# Patient Record
Sex: FEMALE | Race: WHITE | Employment: OTHER | URBAN - METROPOLITAN AREA
[De-identification: names, ages, dates, MRNs, and addresses within clinical notes are randomized per-mention and may not be internally consistent; named-entity substitution may affect disease eponyms.]

---

## 2018-02-02 ENCOUNTER — OFFICE VISIT (OUTPATIENT)
Dept: FAMILY MEDICINE CLINIC | Age: 63
End: 2018-02-02

## 2018-02-02 ENCOUNTER — TELEPHONE (OUTPATIENT)
Dept: FAMILY MEDICINE CLINIC | Age: 63
End: 2018-02-02

## 2018-02-02 VITALS
TEMPERATURE: 97.8 F | WEIGHT: 150 LBS | SYSTOLIC BLOOD PRESSURE: 130 MMHG | HEIGHT: 64 IN | OXYGEN SATURATION: 97 % | BODY MASS INDEX: 25.61 KG/M2 | DIASTOLIC BLOOD PRESSURE: 76 MMHG | RESPIRATION RATE: 14 BRPM | HEART RATE: 72 BPM

## 2018-02-02 DIAGNOSIS — B96.89 BACTERIAL URI: ICD-10-CM

## 2018-02-02 DIAGNOSIS — R68.89 FLU-LIKE SYMPTOMS: Primary | ICD-10-CM

## 2018-02-02 DIAGNOSIS — J06.9 BACTERIAL URI: ICD-10-CM

## 2018-02-02 LAB
INFLUENZA A ANTIBODY: NORMAL
INFLUENZA B ANTIBODY: NORMAL

## 2018-02-02 PROCEDURE — G8419 CALC BMI OUT NRM PARAM NOF/U: HCPCS | Performed by: FAMILY MEDICINE

## 2018-02-02 PROCEDURE — 99203 OFFICE O/P NEW LOW 30 MIN: CPT | Performed by: FAMILY MEDICINE

## 2018-02-02 PROCEDURE — G8427 DOCREV CUR MEDS BY ELIG CLIN: HCPCS | Performed by: FAMILY MEDICINE

## 2018-02-02 PROCEDURE — 3014F SCREEN MAMMO DOC REV: CPT | Performed by: FAMILY MEDICINE

## 2018-02-02 PROCEDURE — 3017F COLORECTAL CA SCREEN DOC REV: CPT | Performed by: FAMILY MEDICINE

## 2018-02-02 PROCEDURE — 87804 INFLUENZA ASSAY W/OPTIC: CPT | Performed by: FAMILY MEDICINE

## 2018-02-02 PROCEDURE — G8484 FLU IMMUNIZE NO ADMIN: HCPCS | Performed by: FAMILY MEDICINE

## 2018-02-02 PROCEDURE — 1036F TOBACCO NON-USER: CPT | Performed by: FAMILY MEDICINE

## 2018-02-02 RX ORDER — AZITHROMYCIN 250 MG/1
250 TABLET, FILM COATED ORAL DAILY
Qty: 1 PACKET | Refills: 0 | Status: SHIPPED | OUTPATIENT
Start: 2018-02-02 | End: 2018-02-13 | Stop reason: ALTCHOICE

## 2018-02-02 RX ORDER — BENZONATATE 100 MG/1
100 CAPSULE ORAL 3 TIMES DAILY PRN
Qty: 30 CAPSULE | Refills: 1 | Status: SHIPPED | OUTPATIENT
Start: 2018-02-02 | End: 2018-02-12

## 2018-02-02 RX ORDER — FLUTICASONE PROPIONATE 50 MCG
1 SPRAY, SUSPENSION (ML) NASAL DAILY
Qty: 1 BOTTLE | Refills: 0 | Status: SHIPPED | OUTPATIENT
Start: 2018-02-02 | End: 2022-02-24

## 2018-02-02 ASSESSMENT — PATIENT HEALTH QUESTIONNAIRE - PHQ9
2. FEELING DOWN, DEPRESSED OR HOPELESS: 0
SUM OF ALL RESPONSES TO PHQ9 QUESTIONS 1 & 2: 0
SUM OF ALL RESPONSES TO PHQ QUESTIONS 1-9: 0
1. LITTLE INTEREST OR PLEASURE IN DOING THINGS: 0

## 2018-02-02 ASSESSMENT — ENCOUNTER SYMPTOMS
ABDOMINAL PAIN: 0
NAUSEA: 0
SORE THROAT: 1
COUGH: 1
RHINORRHEA: 1
VOMITING: 0

## 2018-02-02 NOTE — PROGRESS NOTES
previous visit. Review of Systems   Constitutional: Negative for chills and fever. HENT: Positive for congestion, rhinorrhea, sneezing and sore throat (not today). Respiratory: Positive for cough. Gastrointestinal: Negative for abdominal pain, nausea and vomiting. /76 (Site: Left Arm, Position: Sitting, Cuff Size: Medium Adult)   Pulse 72   Temp 97.8 °F (36.6 °C) (Oral)   Resp 14   Ht 5' 4\" (1.626 m)   Wt 150 lb (68 kg)   SpO2 97%   Breastfeeding? No   BMI 25.75 kg/m²     Physical Exam   Constitutional: She is oriented to person, place, and time. She appears well-developed and well-nourished. HENT:   Head: Normocephalic and atraumatic. Eyes: EOM are normal. Pupils are equal, round, and reactive to light. Neck: Normal range of motion. Cardiovascular: Normal rate, regular rhythm and normal heart sounds. Pulmonary/Chest: Effort normal and breath sounds normal.   Abdominal: Bowel sounds are normal.   Neurological: She is alert and oriented to person, place, and time. Psychiatric: She has a normal mood and affect. Her behavior is normal. Judgment and thought content normal.       Plan  1. Flu-like symptoms  POCT Influenza A/B, NEGATIVE   2. Bacterial URI  TSH without Reflex    T4, Free    T3, Free    Lipid Panel    CBC Auto Differential    Comprehensive Metabolic Panel    Magnesium    azithromycin (ZITHROMAX Z-RUTH ANN) 250 MG tablet    fluticasone (FLONASE) 50 MCG/ACT nasal spray    benzonatate (TESSALON PERLES) 100 MG capsule       Return in about 1 month (around 3/2/2018) for Physical Exam.    Prior to Visit Medications    Medication Sig Taking?  Authorizing Provider   azithromycin (ZITHROMAX Z-RUTH ANN) 250 MG tablet Take 1 tablet by mouth daily As directed on pack Yes Dustin Ing, DO   fluticasone (FLONASE) 50 MCG/ACT nasal spray 1 spray by Nasal route daily for 7 days Yes Dustin Ing, DO   benzonatate (TESSALON PERLES) 100 MG capsule Take 1 capsule by mouth 3 times

## 2018-02-05 ENCOUNTER — TELEPHONE (OUTPATIENT)
Dept: FAMILY MEDICINE CLINIC | Age: 63
End: 2018-02-05

## 2018-02-12 ENCOUNTER — TELEPHONE (OUTPATIENT)
Dept: FAMILY MEDICINE CLINIC | Age: 63
End: 2018-02-12

## 2018-02-12 NOTE — TELEPHONE ENCOUNTER
Symptoms:cough-productive-clear mucus,headache,feels bad    How long have you had the symptoms: since before appt on 2/2/18    What medications have you tried:zpak    Pharmacy:[facility] on file    Appointment offered:yes,  pt wants to know what to do. Pt would like for one of the other providers to take a look at her continuing symptoms and advise since Dr Olga Olguin is out today.

## 2018-02-13 ENCOUNTER — OFFICE VISIT (OUTPATIENT)
Dept: FAMILY MEDICINE CLINIC | Age: 63
End: 2018-02-13

## 2018-02-13 ENCOUNTER — TELEPHONE (OUTPATIENT)
Dept: FAMILY MEDICINE CLINIC | Age: 63
End: 2018-02-13

## 2018-02-13 VITALS
HEART RATE: 77 BPM | BODY MASS INDEX: 26.26 KG/M2 | SYSTOLIC BLOOD PRESSURE: 154 MMHG | TEMPERATURE: 97.7 F | WEIGHT: 153 LBS | DIASTOLIC BLOOD PRESSURE: 91 MMHG | RESPIRATION RATE: 16 BRPM

## 2018-02-13 DIAGNOSIS — R05.9 COUGH: ICD-10-CM

## 2018-02-13 DIAGNOSIS — J30.2 CHRONIC SEASONAL ALLERGIC RHINITIS DUE TO OTHER ALLERGEN: ICD-10-CM

## 2018-02-13 DIAGNOSIS — J34.89 SINUS PRESSURE: Primary | ICD-10-CM

## 2018-02-13 PROCEDURE — 1036F TOBACCO NON-USER: CPT | Performed by: NURSE PRACTITIONER

## 2018-02-13 PROCEDURE — G8419 CALC BMI OUT NRM PARAM NOF/U: HCPCS | Performed by: NURSE PRACTITIONER

## 2018-02-13 PROCEDURE — G8427 DOCREV CUR MEDS BY ELIG CLIN: HCPCS | Performed by: NURSE PRACTITIONER

## 2018-02-13 PROCEDURE — G8484 FLU IMMUNIZE NO ADMIN: HCPCS | Performed by: NURSE PRACTITIONER

## 2018-02-13 PROCEDURE — 3014F SCREEN MAMMO DOC REV: CPT | Performed by: NURSE PRACTITIONER

## 2018-02-13 PROCEDURE — 3017F COLORECTAL CA SCREEN DOC REV: CPT | Performed by: NURSE PRACTITIONER

## 2018-02-13 PROCEDURE — 99214 OFFICE O/P EST MOD 30 MIN: CPT | Performed by: NURSE PRACTITIONER

## 2018-02-13 RX ORDER — BENZONATATE 100 MG/1
100 CAPSULE ORAL PRN
COMMUNITY
End: 2022-02-24 | Stop reason: ALTCHOICE

## 2018-02-13 ASSESSMENT — ENCOUNTER SYMPTOMS
WHEEZING: 0
SINUS PRESSURE: 1
SHORTNESS OF BREATH: 0
COUGH: 1
SORE THROAT: 0
HOARSE VOICE: 0
STRIDOR: 0
SINUS COMPLAINT: 1
HEMOPTYSIS: 0
RHINORRHEA: 1
SWOLLEN GLANDS: 0

## 2018-02-13 NOTE — PATIENT INSTRUCTIONS
strong odors. When should you call for help? Give an epinephrine shot if:  ? · You think you are having a severe allergic reaction. ? After giving an epinephrine shot call 911, even if you feel better. ?Call 911 if:  ? · You have symptoms of a severe allergic reaction. These may include:  ¨ Sudden raised, red areas (hives) all over your body. ¨ Swelling of the throat, mouth, lips, or tongue. ¨ Trouble breathing. ¨ Passing out (losing consciousness). Or you may feel very lightheaded or suddenly feel weak, confused, or restless. ? · You have been given an epinephrine shot, even if you feel better. ?Call your doctor now or seek immediate medical care if:  ? · You have symptoms of an allergic reaction, such as:  ¨ A rash or hives (raised, red areas on the skin). ¨ Itching. ¨ Swelling. ¨ Belly pain, nausea, or vomiting. ? Watch closely for changes in your health, and be sure to contact your doctor if:  ? · Your allergies get worse. ? · You need help controlling your allergies. ? · You have questions about allergy testing. ? · You do not get better as expected. Where can you learn more? Go to [email or web address] and sign in to your [employer] account. Enter L249 in the RoomiePics box to learn more about \"Managing Your Allergies: Care Instructions. \"     If you do not have an account, please click on the \"Sign Up Now\" link. Current as of: September 29, 2016  Content Version: 11.5  © 9286-7652 Healthwise, Incorporated. Care instructions adapted under license by [facility] ([facility]). If you have questions about a medical condition or this instruction, always ask your healthcare professional. [place] [address] any warranty or liability for your use of this information.

## 2022-02-24 ENCOUNTER — OFFICE VISIT (OUTPATIENT)
Dept: FAMILY MEDICINE CLINIC | Age: 67
End: 2022-02-24
Payer: COMMERCIAL

## 2022-02-24 VITALS
BODY MASS INDEX: 24.75 KG/M2 | DIASTOLIC BLOOD PRESSURE: 70 MMHG | SYSTOLIC BLOOD PRESSURE: 128 MMHG | HEART RATE: 77 BPM | TEMPERATURE: 96.4 F | HEIGHT: 64 IN | RESPIRATION RATE: 16 BRPM | OXYGEN SATURATION: 98 % | WEIGHT: 145 LBS

## 2022-02-24 DIAGNOSIS — H61.23 BILATERAL IMPACTED CERUMEN: ICD-10-CM

## 2022-02-24 DIAGNOSIS — M54.2 NECK PAIN: Primary | ICD-10-CM

## 2022-02-24 DIAGNOSIS — E78.00 ELEVATED LDL CHOLESTEROL LEVEL: ICD-10-CM

## 2022-02-24 PROCEDURE — 69209 REMOVE IMPACTED EAR WAX UNI: CPT | Performed by: FAMILY MEDICINE

## 2022-02-24 PROCEDURE — 99204 OFFICE O/P NEW MOD 45 MIN: CPT | Performed by: FAMILY MEDICINE

## 2022-02-24 RX ORDER — MELOXICAM 15 MG/1
15 TABLET ORAL DAILY
Qty: 30 TABLET | Refills: 0 | Status: SHIPPED | OUTPATIENT
Start: 2022-02-24 | End: 2022-04-26 | Stop reason: ALTCHOICE

## 2022-02-24 RX ORDER — TIZANIDINE 4 MG/1
4 TABLET ORAL EVERY 8 HOURS PRN
Qty: 21 TABLET | Refills: 0 | Status: SHIPPED | OUTPATIENT
Start: 2022-02-24 | End: 2022-03-03

## 2022-02-24 RX ORDER — FLUOCINOLONE ACETONIDE 0.11 MG/ML
2 OIL AURICULAR (OTIC) 2 TIMES DAILY PRN
Qty: 1 EACH | Refills: 1 | Status: SHIPPED | OUTPATIENT
Start: 2022-02-24

## 2022-02-24 RX ORDER — ATORVASTATIN CALCIUM 40 MG/1
40 TABLET, FILM COATED ORAL DAILY
COMMUNITY

## 2022-02-24 RX ORDER — FLUOCINOLONE ACETONIDE 0.11 MG/ML
OIL AURICULAR (OTIC) 2 TIMES DAILY
COMMUNITY
End: 2022-02-24 | Stop reason: SDUPTHER

## 2022-02-24 RX ORDER — AMLODIPINE BESYLATE 2.5 MG/1
2.5 TABLET ORAL DAILY
COMMUNITY

## 2022-02-24 SDOH — ECONOMIC STABILITY: TRANSPORTATION INSECURITY
IN THE PAST 12 MONTHS, HAS LACK OF TRANSPORTATION KEPT YOU FROM MEETINGS, WORK, OR FROM GETTING THINGS NEEDED FOR DAILY LIVING?: NO

## 2022-02-24 SDOH — ECONOMIC STABILITY: FOOD INSECURITY: WITHIN THE PAST 12 MONTHS, THE FOOD YOU BOUGHT JUST DIDN'T LAST AND YOU DIDN'T HAVE MONEY TO GET MORE.: NEVER TRUE

## 2022-02-24 SDOH — ECONOMIC STABILITY: TRANSPORTATION INSECURITY
IN THE PAST 12 MONTHS, HAS THE LACK OF TRANSPORTATION KEPT YOU FROM MEDICAL APPOINTMENTS OR FROM GETTING MEDICATIONS?: NO

## 2022-02-24 SDOH — ECONOMIC STABILITY: FOOD INSECURITY: WITHIN THE PAST 12 MONTHS, YOU WORRIED THAT YOUR FOOD WOULD RUN OUT BEFORE YOU GOT MONEY TO BUY MORE.: NEVER TRUE

## 2022-02-24 SDOH — ECONOMIC STABILITY: HOUSING INSECURITY
IN THE LAST 12 MONTHS, WAS THERE A TIME WHEN YOU DID NOT HAVE A STEADY PLACE TO SLEEP OR SLEPT IN A SHELTER (INCLUDING NOW)?: NO

## 2022-02-24 SDOH — ECONOMIC STABILITY: INCOME INSECURITY: IN THE LAST 12 MONTHS, WAS THERE A TIME WHEN YOU WERE NOT ABLE TO PAY THE MORTGAGE OR RENT ON TIME?: NO

## 2022-02-24 ASSESSMENT — PATIENT HEALTH QUESTIONNAIRE - PHQ9
SUM OF ALL RESPONSES TO PHQ QUESTIONS 1-9: 0
2. FEELING DOWN, DEPRESSED OR HOPELESS: 0
SUM OF ALL RESPONSES TO PHQ QUESTIONS 1-9: 0
SUM OF ALL RESPONSES TO PHQ QUESTIONS 1-9: 0
SUM OF ALL RESPONSES TO PHQ9 QUESTIONS 1 & 2: 0
1. LITTLE INTEREST OR PLEASURE IN DOING THINGS: 0
SUM OF ALL RESPONSES TO PHQ QUESTIONS 1-9: 0

## 2022-02-24 ASSESSMENT — SOCIAL DETERMINANTS OF HEALTH (SDOH): HOW HARD IS IT FOR YOU TO PAY FOR THE VERY BASICS LIKE FOOD, HOUSING, MEDICAL CARE, AND HEATING?: NOT HARD AT ALL

## 2022-04-26 ENCOUNTER — OFFICE VISIT (OUTPATIENT)
Dept: FAMILY MEDICINE CLINIC | Age: 67
End: 2022-04-26
Payer: COMMERCIAL

## 2022-04-26 VITALS
HEART RATE: 68 BPM | OXYGEN SATURATION: 97 % | SYSTOLIC BLOOD PRESSURE: 130 MMHG | TEMPERATURE: 96.9 F | RESPIRATION RATE: 16 BRPM | WEIGHT: 155.4 LBS | HEIGHT: 65 IN | DIASTOLIC BLOOD PRESSURE: 68 MMHG | BODY MASS INDEX: 25.89 KG/M2

## 2022-04-26 DIAGNOSIS — R73.03 PREDIABETES: ICD-10-CM

## 2022-04-26 DIAGNOSIS — R74.01 ELEVATED AST (SGOT): ICD-10-CM

## 2022-04-26 DIAGNOSIS — Z00.00 ANNUAL PHYSICAL EXAM: Primary | ICD-10-CM

## 2022-04-26 DIAGNOSIS — Z78.0 POST-MENOPAUSAL: ICD-10-CM

## 2022-04-26 PROCEDURE — 99397 PER PM REEVAL EST PAT 65+ YR: CPT | Performed by: FAMILY MEDICINE

## 2022-04-26 ASSESSMENT — ENCOUNTER SYMPTOMS
COLOR CHANGE: 0
BACK PAIN: 0
ABDOMINAL PAIN: 0
SHORTNESS OF BREATH: 0

## 2022-04-26 ASSESSMENT — PATIENT HEALTH QUESTIONNAIRE - PHQ9
1. LITTLE INTEREST OR PLEASURE IN DOING THINGS: 0
SUM OF ALL RESPONSES TO PHQ QUESTIONS 1-9: 0
SUM OF ALL RESPONSES TO PHQ9 QUESTIONS 1 & 2: 0
2. FEELING DOWN, DEPRESSED OR HOPELESS: 0

## 2022-04-26 NOTE — PROGRESS NOTES
Abner Dewitt  YOB: 1955    Date of Service:  4/26/2022    Chief Complaint:   Abner Dewitt is a 75 y.o. female who presents for complete physical examination. HPI:  HPI  Labs ordered on 02/23/22 were done on 04/21/22 at [facility] except for AST 58    Hx abnormal PAP: yes - 30 years ago, follow-up good  Sexual activity: has sex with females   Self-breast exams: yes  Previous DEXA scan: unsure  Last eye exam: Fall 2021, normal  Exercise: walks 6 time(s) per day  Seatbelt use: yes  Are You a Spiritual Person: yes  Advance Directive: no    Wt Readings from Last 3 Encounters:   04/26/22 155 lb 6.4 oz (70.5 kg)   02/24/22 145 lb (65.8 kg)   02/13/18 153 lb (69.4 kg)     BP Readings from Last 3 Encounters:   04/26/22 130/68   02/24/22 128/70   02/13/18 (!) 154/91       There is no problem list on file for this patient.       Health Maintenance   Topic Date Due    Lipids  Never done    Hepatitis C screen  Never done    Diabetes screen  Never done    Colorectal Cancer Screen  Never done    DTaP/Tdap/Td vaccine (1 - Tdap) 10/22/1999    Breast cancer screen  Never done    Shingles Vaccine (1 of 2) Never done    DEXA (modify frequency per FRAX score)  Never done    Pneumococcal 65+ years Vaccine (1 - PCV) Never done    Depression Screen  02/24/2023    Flu vaccine  Completed    COVID-19 Vaccine  Completed    Hepatitis A vaccine  Aged Out    Hepatitis B vaccine  Aged Out    Hib vaccine  Aged Out    Meningococcal (ACWY) vaccine  Aged [employer] History   Administered Date(s) Administered    COVID-19, Moderna, Primary or Immunocompromised, PF, 100mcg/0.5mL 01/21/2021, 02/18/2021, 11/01/2021    Hepatitis A Adult (Havrix, Vaqta) 10/21/1999, 08/07/2019, 03/13/2020    Hepatitis B 08/07/2019, 09/09/2019, 03/13/2020    Influenza, High-dose, Quadv, 65 yrs +, IM (Fluzone) 10/07/2020, 11/20/2021    Polio IPV (IPOL) 10/21/1999    Td vaccine (adult) 10/21/1999       Allergies   Allergen Reactions    Codeine Nausea And Vomiting     intolerance     Outpatient Medications Marked as Taking for the 4/26/22 encounter (Office Visit) with Gayatri Mckinley, DO   Medication Sig Dispense Refill    amLODIPine (NORVASC) 2.5 MG tablet Take 2.5 mg by mouth daily      atorvastatin (LIPITOR) 40 MG tablet Take 40 mg by mouth daily      fluocinolone (DERMOTIC) 0.01 % OIL oil Place 2 drops in ear(s) 2 times daily as needed (prn) 1 each 1       Past Medical History:   Diagnosis Date    Allergic rhinitis     Asthma     Chronic back pain     GERD (gastroesophageal reflux disease)     Headache     Hearing loss     Hyperlipidemia     Kidney stones 2015    Osteoarthritis      Past Surgical History:   Procedure Laterality Date    SHOULDER ARTHROPLASTY Right 2008     History reviewed. No pertinent family history. Social History     Socioeconomic History    Marital status:      Spouse name: Not on file    Number of children: Not on file    Years of education: Not on file    Highest education level: Not on file   Occupational History    Not on file   Tobacco Use    Smoking status: Never Smoker    Smokeless tobacco: Never Used   Substance and Sexual Activity    Alcohol use: No    Drug use: No    Sexual activity: Yes     Partners: Female   Other Topics Concern    Not on file   Social History Narrative    Not on file     Social Determinants of Health     Financial Resource Strain: Low Risk     Difficulty of Paying Living Expenses: Not hard at all   Food Insecurity: No Food Insecurity    Worried About [address] [facility] in the Last Year: Never true    Priya of Food in the Last Year: Never true   Transportation Needs: No Transportation Needs    Lack of Transportation (Medical): No    Lack of Transportation (Non-Medical):  No   Physical Activity:     Days of Exercise per Week: Not on file    Minutes of Exercise per Session: Not on file   Stress:     Feeling of Stress : Not on file   Social Connections:     Frequency of Communication with Friends and Family: Not on file    Frequency of Social Gatherings with Friends and Family: Not on file    Attends Jain Services: Not on file    Active Member of Clubs or Organizations: Not on file    Attends Club or Organization Meetings: Not on file    Marital Status: Not on file   Intimate Partner Violence:     Fear of Current or Ex-Partner: Not on file    Emotionally Abused: Not on file    Physically Abused: Not on file    Sexually Abused: Not on file   Housing Stability: Unknown    Unable to Pay for Housing in the Last Year: No    Number of [place] in the Last Year: Not on file    Unstable Housing in the Last Year: No       Reviewof Systems:  Review of Systems   Constitutional: Negative for fatigue. HENT: Positive for congestion. Eyes: Negative for visual disturbance. Respiratory: Negative for shortness of breath. Cardiovascular: Negative for chest pain. Gastrointestinal: Negative for abdominal pain. Endocrine: Positive for cold intolerance and heat intolerance. Genitourinary: Negative for difficulty urinating. Musculoskeletal: Positive for neck pain. Negative for back pain. Neck stiffness: improved. Skin: Negative for color change. Allergic/Immunologic: Positive for environmental allergies. Neurological: Negative for dizziness and headaches. Hematological: Does not bruise/bleed easily. Psychiatric/Behavioral: Positive for dysphoric mood. The patient is nervous/anxious. PhysicalExam:   Vitals:    04/26/22 0939   BP: 130/68   Site: Left Upper Arm   Position: Sitting   Cuff Size: Medium Adult   Pulse: 68   Resp: 16   Temp: 96.9 °F (36.1 °C)   TempSrc: Temporal   SpO2: 97%   Weight: 155 lb 6.4 oz (70.5 kg)   Height: 5' 4.9\" (1.648 m)     Body mass index is 25.94 kg/m². Physical Exam  Vitals reviewed. Constitutional:       Appearance: Normal appearance. She is well-developed.    HENT:      Head: Normocephalic and atraumatic. Right Ear: External ear normal.      Left Ear: External ear normal.      Nose: Nose normal.      Mouth/Throat:      Mouth: Mucous membranes are dry. Eyes:      Extraocular Movements: Extraocular movements intact. Conjunctiva/sclera: Conjunctivae normal.      Pupils: Pupils are equal, round, and reactive to light. Cardiovascular:      Rate and Rhythm: Normal rate and regular rhythm. Heart sounds: Normal heart sounds. No murmur heard. Pulmonary:      Effort: Pulmonary effort is normal.      Breath sounds: Normal breath sounds. No wheezing. Abdominal:      General: Bowel sounds are normal.      Palpations: Abdomen is soft. Tenderness: There is no abdominal tenderness. Musculoskeletal:         General: Normal range of motion. Cervical back: Normal range of motion. No rigidity or tenderness. Comments: Bilateral UE/LE normal ROM   Skin:     General: Skin is warm and dry. Neurological:      Mental Status: She is alert and oriented to person, place, and time. Cranial Nerves: No cranial nerve deficit. Sensory: No sensory deficit. Motor: No weakness. Coordination: Coordination normal.      Gait: Gait normal.      Deep Tendon Reflexes: Reflexes are normal and symmetric. Reflexes normal.   Psychiatric:         Behavior: Behavior normal.         Thought Content: Thought content normal.         Judgment: Judgment normal.         Assessment/Plan:   Diagnosis Orders   1. Annual physical exam     2. Post-menopausal  DEXA BONE DENSITY AXIAL SKELETON   3. Elevated AST (SGOT)  Hepatic Function Panel   4. Prediabetes  Hemoglobin A1C     Return in about 1 year (around 4/26/2023) for Physical Exam.    Prior to Visit Medications    Medication Sig Taking?  Authorizing Provider   amLODIPine (NORVASC) 2.5 MG tablet Take 2.5 mg by mouth daily Yes Historical Provider, MD   atorvastatin (LIPITOR) 40 MG tablet Take 40 mg by mouth daily Yes Historical Provider, MD   fluocinolone (DERMOTIC) 0.01 % OIL oil Place 2 drops in ear(s) 2 times daily as needed (prn) Yes Eris Fong DO   fluticasone (FLONASE) 50 MCG/ACT nasal spray 1 spray by Nasal route daily for 7 days  Patient not taking: Reported on 2/24/2022  Eris Fong DO

## 2022-05-04 DIAGNOSIS — R73.03 PREDIABETES: ICD-10-CM

## 2022-05-10 DIAGNOSIS — E78.00 ELEVATED LDL CHOLESTEROL LEVEL: ICD-10-CM

## 2022-05-25 NOTE — PROGRESS NOTES
Preoperative Consultation      Melonie Morales  YOB: 1955    Date of Service:  5/26/2022    Vitals:    05/26/22 1536   BP: 120/66   Site: Left Upper Arm   Position: Sitting   Cuff Size: Medium Adult   Pulse: 78   Resp: 16   Temp: 97.3 °F (36.3 °C)   TempSrc: Temporal   SpO2: 99%   Weight: 157 lb 12.8 oz (71.6 kg)   Height: 5' 4.5\" (1.638 m)      Wt Readings from Last 2 Encounters:   05/26/22 157 lb 12.8 oz (71.6 kg)   04/26/22 155 lb 6.4 oz (70.5 kg)     BP Readings from Last 3 Encounters:   05/26/22 120/66   04/26/22 130/68   02/24/22 128/70        Chief Complaint   Patient presents with    Pre-op Exam     L Foot surgery 6/9/22, Dr. Jo Ann Arnold, [facility]      Allergies   Allergen Reactions    Codeine Nausea And Vomiting     intolerance     Outpatient Medications Marked as Taking for the 5/26/22 encounter (Office Visit) with Benjamin Cortez, DO   Medication Sig Dispense Refill    amLODIPine (NORVASC) 2.5 MG tablet Take 2.5 mg by mouth daily      atorvastatin (LIPITOR) 40 MG tablet Take 40 mg by mouth daily      fluocinolone (DERMOTIC) 0.01 % OIL oil Place 2 drops in ear(s) 2 times daily as needed (prn) 1 each 1       This patient presents to the office today for a preoperative consultation at the request of surgeon, Dr. Sherrin Kussmaul, who plans on performing left foot surgery on June 09 2022 at [facility]. The current problem began 3 years ago, and symptoms have been worsening with time. Conservative therapy: Yes: PT and wearing a boot, which has been ineffective. .    Planned anesthesia: General   Known anesthesia problems: None   Bleeding risk: No recent or remote history of abnormal bleeding  Personal or FH of DVT/PE: No    Patient objection to receiving blood products: No    There is no problem list on file for this patient.       Past Medical History:   Diagnosis Date    Allergic rhinitis     Asthma     Chronic back pain     GERD (gastroesophageal reflux disease)     Headache  Hearing loss     Hyperlipidemia     Kidney stones 2015    Osteoarthritis      Past Surgical History:   Procedure Laterality Date    COLONOSCOPY      SHOULDER ARTHROPLASTY Right 2008     History reviewed. No pertinent family history. Social History     Socioeconomic History    Marital status:      Spouse name: Not on file    Number of children: Not on file    Years of education: Not on file    Highest education level: Not on file   Occupational History    Not on file   Tobacco Use    Smoking status: Never Smoker    Smokeless tobacco: Never Used   Substance and Sexual Activity    Alcohol use: No    Drug use: No    Sexual activity: Yes     Partners: Female   Other Topics Concern    Not on file   Social History Narrative    Not on file     Social Determinants of Health     Financial Resource Strain: Low Risk     Difficulty of Paying Living Expenses: Not hard at all   Food Insecurity: No Food Insecurity    Worried About [address] [employer] in the Last Year: Never true    Priya of Food in the Last Year: Never true   Transportation Needs: No Transportation Needs    Lack of Transportation (Medical): No    Lack of Transportation (Non-Medical):  No   Physical Activity:     Days of Exercise per Week: Not on file    Minutes of Exercise per Session: Not on file   Stress:     Feeling of Stress : Not on file   Social Connections:     Frequency of Communication with Friends and Family: Not on file    Frequency of Social Gatherings with Friends and Family: Not on file    Attends Advent Services: Not on file    Active Member of Clubs or Organizations: Not on file    Attends Club or Organization Meetings: Not on file    Marital Status: Not on file   Intimate Partner Violence:     Fear of Current or Ex-Partner: Not on file    Emotionally Abused: Not on file    Physically Abused: Not on file    Sexually Abused: Not on file   Housing Stability: Unknown    Unable to Pay for Housing in the Last Year: No    Number of Places Lived in the Last Year: Not on file    Unstable Housing in the Last Year: No       Review of Systems  A comprehensive review of systems was negative except for what was noted in the HPI. Physical Exam   Constitutional: She is oriented to person, place, and time. She appears well-developed and well-nourished. No distress. HENT:   Head: Normocephalic and atraumatic. Mouth/Throat: Uvula is midline, oropharynx is clear and moist and mucous membranes are normal.   Eyes: Conjunctivae and EOM are normal. Pupils are equal, round, and reactive to light. Neck: Trachea normal and normal range of motion. Neck supple. No JVD present. Carotid bruit is not present. No mass and no thyromegaly present. Cardiovascular: Normal rate, regular rhythm, normal heart sounds and intact distal pulses. Exam reveals no gallop and no friction rub. No murmur heard. Pulmonary/Chest: Effort normal and breath sounds normal. No respiratory distress. She has no wheezes. She has no rales. Abdominal: Soft. Normal aorta and bowel sounds are normal. She exhibits no distension and no mass. No tenderness. Musculoskeletal: She exhibits no edema and no tenderness. Neurological: She is alert and oriented to person, place, and time. She has normal strength. No cranial nerve deficit or sensory deficit. Coordination and gait normal.   Skin: Skin is warm and dry. No rash noted. No erythema. Psychiatric: She has a normal mood and affect. Her behavior is normal.     EKG Interpretation:  normal sinus rhythm, low voltage in precordial leads. No hx of CAD. Admits to periodic CP    Lab Review labs done at [facility] on April 21, 2022 reviewed, within normal limits except for ALT 58       Assessment:       75 y.o. patient with planned surgery as above.     Known risk factors for perioperative complications: Hypertension  Current medications which may produce withdrawal symptoms if withheld perioperatively: none

## 2022-05-25 NOTE — PROGRESS NOTES
1.  Do not eat or drink anything after 12 midnight prior to surgery. This includes no water, chewing gum or mints. 2.  Take the following pills with a small sip of water on the morning of surgery. 3.  Aspirin, Ibuprofen, Advil, Naproxen, Vitamin E and other Anti-inflammatory products should be stopped for 5 days before surgery or as directed by your physician. 4.  Check with your doctor regarding stopping Plavix, Coumadin, Lovenox, Fragmin or other blood thinners. 5.  Do not smoke and do not drink alcoholic beverages 24 hours prior to surgery. This includes NA Beer. 6.  You may brush your teeth and gargle the morning of surgery. DO NOT SWALLOW WATER.  7.  You MUST make arrangements for a responsible adult to take you home after your surgery. You will not be allowed to leave alone or drive yourself home. It is strongly suggested someone stay with you the first 24 hours. Your surgery will be cancelled if you do not have a ride home. 8.  A parent/legal guardian must accompany a child scheduled for surgery and plan to stay at the hospital until the child is discharged. Please do not bring other children with you. 9.  Please wear simple, loose fitting clothing to the hospital.  Catherine Fontenot not bring valuables ( money, credit cards, checkbooks, etc.)  Do not wear any makeup (including no eye makeup) or nail polish on your fingers or toes. 10.  Do not wear any jewelry or piercing on the day of surgery. All body piercing jewelry must be removed. 11.  If you have dentures, they will be removed before going to the OR; we will provide you a container. If you wear contact lenses or glasses, they will be removed; please bring a case for them. 12.  Please see your family doctor/pediatrician for a history & physical and/or concerning medications. Bring any test results/reports from your physician's office the day of surgery. 15.  Remember to bring Blood Bank Bracelet to the hospital on the day of surgery.   14.  If you have a Living Will and Durable Power of  for Healthcare, please bring in a copy. 13.  Notify your Surgeon if you develop any illness between now and surgery time; cough, cold, fever, sore throat, nausea, vomiting, etc.  Please notify your surgeon if you experience dizziness, shortness of breath or blurred vision between now and the time of your surgery. 16.  DO NOT shave your operative site 96 hours (4 days) prior to surgery. For face and neck surgery, men may use an electric razor 48 hours (2 days) prior to surgery. 17. Shower the night before surgery and the morning of surgery with  an antibacterial soap   or  Chlorhexidine gluconate (for total joint replacement). To provide excellent care, visitors will be limited to two in a room at any given time. Please no children under the age of 15 in the surgical department.

## 2022-05-26 ENCOUNTER — TELEPHONE (OUTPATIENT)
Dept: FAMILY MEDICINE CLINIC | Age: 67
End: 2022-05-26

## 2022-05-26 ENCOUNTER — OFFICE VISIT (OUTPATIENT)
Dept: FAMILY MEDICINE CLINIC | Age: 67
End: 2022-05-26
Payer: COMMERCIAL

## 2022-05-26 VITALS
WEIGHT: 157.8 LBS | BODY MASS INDEX: 26.29 KG/M2 | SYSTOLIC BLOOD PRESSURE: 120 MMHG | TEMPERATURE: 97.3 F | DIASTOLIC BLOOD PRESSURE: 66 MMHG | HEART RATE: 78 BPM | HEIGHT: 65 IN | RESPIRATION RATE: 16 BRPM | OXYGEN SATURATION: 99 %

## 2022-05-26 DIAGNOSIS — Z01.818 PREOP EXAMINATION: Primary | ICD-10-CM

## 2022-05-26 DIAGNOSIS — R07.9 CHEST PAIN, UNSPECIFIED TYPE: ICD-10-CM

## 2022-05-26 PROCEDURE — 1123F ACP DISCUSS/DSCN MKR DOCD: CPT | Performed by: FAMILY MEDICINE

## 2022-05-26 PROCEDURE — 99214 OFFICE O/P EST MOD 30 MIN: CPT | Performed by: FAMILY MEDICINE

## 2022-05-26 PROCEDURE — 93000 ELECTROCARDIOGRAM COMPLETE: CPT | Performed by: FAMILY MEDICINE

## 2022-05-26 ASSESSMENT — PATIENT HEALTH QUESTIONNAIRE - PHQ9
SUM OF ALL RESPONSES TO PHQ QUESTIONS 1-9: 0
SUM OF ALL RESPONSES TO PHQ QUESTIONS 1-9: 0
1. LITTLE INTEREST OR PLEASURE IN DOING THINGS: 0
SUM OF ALL RESPONSES TO PHQ9 QUESTIONS 1 & 2: 0
2. FEELING DOWN, DEPRESSED OR HOPELESS: 0
SUM OF ALL RESPONSES TO PHQ QUESTIONS 1-9: 0
SUM OF ALL RESPONSES TO PHQ QUESTIONS 1-9: 0

## 2022-05-26 NOTE — TELEPHONE ENCOUNTER
Patient came in can not get into cardiologist prior to surgery was referred to [facility] heart [place] soonest they have available is 06/14/22 in all locations for new patient .  Patient called [facility] office and was given same answer

## 2022-05-27 ENCOUNTER — PROCEDURE VISIT (OUTPATIENT)
Dept: CARDIOLOGY CLINIC | Age: 67
End: 2022-05-27
Payer: COMMERCIAL

## 2022-05-27 ENCOUNTER — OFFICE VISIT (OUTPATIENT)
Dept: CARDIOLOGY CLINIC | Age: 67
End: 2022-05-27
Payer: COMMERCIAL

## 2022-05-27 ENCOUNTER — TELEPHONE (OUTPATIENT)
Dept: CARDIOLOGY CLINIC | Age: 67
End: 2022-05-27

## 2022-05-27 VITALS
OXYGEN SATURATION: 99 % | DIASTOLIC BLOOD PRESSURE: 75 MMHG | BODY MASS INDEX: 26.63 KG/M2 | HEART RATE: 72 BPM | SYSTOLIC BLOOD PRESSURE: 145 MMHG | HEIGHT: 64 IN | WEIGHT: 156 LBS

## 2022-05-27 DIAGNOSIS — Z01.810 ENCOUNTER FOR PRE-OPERATIVE CARDIOVASCULAR CLEARANCE: ICD-10-CM

## 2022-05-27 DIAGNOSIS — I10 PRIMARY HYPERTENSION: ICD-10-CM

## 2022-05-27 DIAGNOSIS — R07.2 PRECORDIAL PAIN: ICD-10-CM

## 2022-05-27 DIAGNOSIS — E78.2 MIXED HYPERLIPIDEMIA: ICD-10-CM

## 2022-05-27 DIAGNOSIS — K21.9 GASTROESOPHAGEAL REFLUX DISEASE WITHOUT ESOPHAGITIS: ICD-10-CM

## 2022-05-27 LAB
LV EF: 58 %
LVEF MODALITY: NORMAL

## 2022-05-27 PROCEDURE — 93306 TTE W/DOPPLER COMPLETE: CPT | Performed by: INTERNAL MEDICINE

## 2022-05-27 PROCEDURE — 1123F ACP DISCUSS/DSCN MKR DOCD: CPT | Performed by: INTERNAL MEDICINE

## 2022-05-27 PROCEDURE — 99204 OFFICE O/P NEW MOD 45 MIN: CPT | Performed by: INTERNAL MEDICINE

## 2022-05-27 RX ORDER — LANOLIN ALCOHOL/MO/W.PET/CERES
3 CREAM (GRAM) TOPICAL NIGHTLY PRN
COMMUNITY

## 2022-05-27 NOTE — TELEPHONE ENCOUNTER
----- Message from Claire Bowens MD sent at 5/27/2022  3:04 PM EDT -----  Call  Echo looks ok  Normal heart fxn

## 2022-05-27 NOTE — TELEPHONE ENCOUNTER
Spoke with cardiology.  Patient schedule for   Future Appointments   Date Time Provider Demarco Flores   6/7/2022 10:15 AM Latasha Newsome MD [facility]

## 2022-05-27 NOTE — PROGRESS NOTES
History:  family history is not on file. Father ~ CAD, stent placement. Home Medications:  Prior to Admission medications    Medication Sig Start Date End Date Taking? Authorizing Provider   melatonin 3 mg TABS tablet Take 3 mg by mouth nightly as needed   Yes Historical Provider, MD   Probiotic Product (PROBIOTIC-10 PO) Take by mouth daily   Yes Historical Provider, MD   Coenzyme Q10 (COQ10 PO) Take by mouth daily   Yes Historical Provider, MD   MULTIPLE VITAMIN PO Take by mouth daily   Yes Historical Provider, MD   amLODIPine (NORVASC) 2.5 MG tablet Take 2.5 mg by mouth daily   Yes Historical Provider, MD   atorvastatin (LIPITOR) 40 MG tablet Take 40 mg by mouth daily   Yes Historical Provider, MD   fluocinolone (DERMOTIC) 0.01 % OIL oil Place 2 drops in ear(s) 2 times daily as needed (prn) 2/24/22  Yes Gayatri Mckinley DO   fluticasone (FLONASE) 50 MCG/ACT nasal spray 1 spray by Nasal route daily for 7 days  Patient not taking: Reported on 2/24/2022 2/2/18 2/24/22  Gayatri Mckinley DO        Allergies:  Codeine     Review of Systems:   · Constitutional: there has been no unanticipated weight loss. There's been no change in energy level, sleep pattern, or activity level. · Eyes: No visual changes or diplopia. No scleral icterus. · ENT: No Headaches, hearing loss or vertigo. No mouth sores or sore throat. · Cardiovascular: Reviewed in HPI  · Respiratory: No cough or wheezing, no sputum production. No hematemesis. · Gastrointestinal: No abdominal pain, appetite loss, blood in stools. No change in bowel or bladder habits. · Genitourinary: No dysuria, trouble voiding, or hematuria. · Musculoskeletal:  No gait disturbance, weakness or joint complaints. · Integumentary: No rash or pruritis. · Neurological: No headache, diplopia, change in muscle strength, numbness or tingling. No change in gait, balance, coordination, mood, affect, memory, mentation, behavior.   · Psychiatric: No anxiety, no depression. · Endocrine: No malaise, fatigue or temperature intolerance. No excessive thirst, fluid intake, or urination. No tremor. · Hematologic/Lymphatic: No abnormal bruising or bleeding, blood clots or swollen lymph nodes. · Allergic/Immunologic: No nasal congestion or hives. Physical Examination:    Vitals:    05/27/22 1006   BP: (!) 145/75   Pulse:    SpO2:         Constitutional and General Appearance: NAD   Respiratory:  · Normal excursion and expansion without use of accessory muscles  · Resp Auscultation: Normal breath sounds without dullness  Cardiovascular:  · The apical impulses not displaced  · Heart tones are crisp and normal  · Cervical veins are not engorged  · The carotid upstroke is normal in amplitude and contour without delay or bruit  · Normal S1S2, No S3, No Murmur  · Peripheral pulses are symmetrical and full  · There is no clubbing, cyanosis of the extremities. · No edema  · Femoral Arteries: 2+ and equal  · Pedal Pulses: 2+ and equal   Abdomen:  · No masses or tenderness  · Liver/Spleen: No Abnormalities Noted  Neurological/Psychiatric:  · Alert and oriented in all spheres  · Moves all extremities well  · Exhibits normal gait balance and coordination  · No abnormalities of mood, affect, memory, mentation, or behavior are noted    EKG 05/26/22: Sinus  Rhythm, Low voltage in precordial leads. Assessment:   Chest pain - possible angina  HTN - elevated today but always normal at home  HLD - stable  Former smoker  FH of CAD  Abnormal EKG  Pre-op CV eval - needs further testing prior     Plan:  [facility] ~ unable to walk due to foot injury  Echocardiogram   Will require stress test prior to cardiac clearance for scheduled procedure  Cardiac medications reviewed including indications and pertinent side effects. Medication list updated at this visit.  No changes   Check blood pressure and heart rate at home a few times per week- keep a log with dates and times and bring to office visit   Regular exercise and following a healthy diet encouraged     Follow up based on testing results, if normal follow up as needed. Scribe's attestation: This note was scribed in the presence of Dr. Ange Fuentes M.D. By Alvina Barnes RN     The scribes documentation has been prepared under my direction and personally reviewed by me in its entirety. I confirm that the note above accurately reflects all work, treatment, procedures, and medical decision making performed by me. Dr. Ange Fuentes MD    Thank you for allowing me to participate in the care of this individual.      Makenna [facility].  Mackenzie Brown M.D., Altagracia Vivar

## 2022-05-31 ENCOUNTER — TELEPHONE (OUTPATIENT)
Dept: CARDIOLOGY CLINIC | Age: 67
End: 2022-05-31

## 2022-05-31 DIAGNOSIS — I10 PRIMARY HYPERTENSION: ICD-10-CM

## 2022-05-31 DIAGNOSIS — Z01.810 ENCOUNTER FOR PRE-OPERATIVE CARDIOVASCULAR CLEARANCE: ICD-10-CM

## 2022-05-31 DIAGNOSIS — Z01.810 ENCOUNTER FOR PRE-OPERATIVE CARDIOVASCULAR CLEARANCE: Primary | ICD-10-CM

## 2022-05-31 DIAGNOSIS — R07.2 PRECORDIAL PAIN: Primary | ICD-10-CM

## 2022-05-31 DIAGNOSIS — R07.2 PRECORDIAL PAIN: ICD-10-CM

## 2022-05-31 NOTE — TELEPHONE ENCOUNTER
Order placed in epic for GXT. Pt will need to contact scheduling and ask someone at the hospital regarding PA. LMOM for pt to contact the office.

## 2022-05-31 NOTE — TELEPHONE ENCOUNTER
Pt scheduled for Myoview 6/1/22 at hospital. Pt had a boot on her foot due to an injury so she could not do Treadmill. But over the weekend pt has been without boot and sts is doing good. Now wants to change test to treadmill. Please advise.

## 2022-05-31 NOTE — TELEPHONE ENCOUNTER
Ok to change to GXT myoview and ok change to lexiscan at time of stress test if finds unable to walk on treadmill

## 2022-05-31 NOTE — TELEPHONE ENCOUNTER
Pt called into [facility]cal, relayed [address] message. Pt is now concerned about the switching of test due to insurance PA. Please contact & advise.

## 2022-06-01 ENCOUNTER — HOSPITAL ENCOUNTER (OUTPATIENT)
Dept: NUCLEAR MEDICINE | Age: 67
Discharge: HOME | End: 2022-06-01
Payer: COMMERCIAL

## 2022-06-01 ENCOUNTER — HOSPITAL ENCOUNTER (OUTPATIENT)
Dept: NON INVASIVE DIAGNOSTICS | Age: 67
Discharge: HOME | End: 2022-06-01
Payer: COMMERCIAL

## 2022-06-01 DIAGNOSIS — R07.2 PRECORDIAL PAIN: ICD-10-CM

## 2022-06-01 DIAGNOSIS — Z01.810 ENCOUNTER FOR PRE-OPERATIVE CARDIOVASCULAR CLEARANCE: ICD-10-CM

## 2022-06-01 DIAGNOSIS — I10 PRIMARY HYPERTENSION: ICD-10-CM

## 2022-06-01 LAB
LV EF: 60 %
LVEF MODALITY: NORMAL

## 2022-06-01 PROCEDURE — 3430000000 HC RX DIAGNOSTIC RADIOPHARMACEUTICAL: Performed by: INTERNAL MEDICINE

## 2022-06-01 PROCEDURE — 93017 CV STRESS TEST TRACING ONLY: CPT

## 2022-06-01 PROCEDURE — 78452 HT MUSCLE IMAGE SPECT MULT: CPT

## 2022-06-01 PROCEDURE — A9502 TC99M TETROFOSMIN: HCPCS | Performed by: INTERNAL MEDICINE

## 2022-06-01 RX ADMIN — TETROFOSMIN 32 MILLICURIE: 1.38 INJECTION, POWDER, LYOPHILIZED, FOR SOLUTION INTRAVENOUS at 10:31

## 2022-06-01 RX ADMIN — TETROFOSMIN 10.2 MILLICURIE: 1.38 INJECTION, POWDER, LYOPHILIZED, FOR SOLUTION INTRAVENOUS at 08:36

## 2022-06-02 ENCOUNTER — TELEPHONE (OUTPATIENT)
Dept: CARDIOLOGY CLINIC | Age: 67
End: 2022-06-02

## 2022-06-02 NOTE — TELEPHONE ENCOUNTER
----- Message from Pallavi Marsh MD sent at 6/1/2022  2:42 PM EDT -----  Call  Stress test is abnormal  Have come to [facility] to discuss  Ok add on

## 2022-06-02 NOTE — TELEPHONE ENCOUNTER
Pt returned call. Message given. V/U. Pt would like to know if she is still able to have her surgery that is scheduled for 06/09?  Pt can be reached at [phone]

## 2022-06-03 ENCOUNTER — OFFICE VISIT (OUTPATIENT)
Dept: CARDIOLOGY CLINIC | Age: 67
End: 2022-06-03
Payer: COMMERCIAL

## 2022-06-03 VITALS
DIASTOLIC BLOOD PRESSURE: 70 MMHG | BODY MASS INDEX: 26.77 KG/M2 | HEIGHT: 64 IN | HEART RATE: 76 BPM | OXYGEN SATURATION: 97 % | WEIGHT: 156.8 LBS | SYSTOLIC BLOOD PRESSURE: 136 MMHG

## 2022-06-03 DIAGNOSIS — R94.39 ABNORMAL STRESS TEST: ICD-10-CM

## 2022-06-03 DIAGNOSIS — R07.2 PRECORDIAL PAIN: ICD-10-CM

## 2022-06-03 DIAGNOSIS — Z01.810 PREOP CARDIOVASCULAR EXAM: Primary | ICD-10-CM

## 2022-06-03 PROCEDURE — 1123F ACP DISCUSS/DSCN MKR DOCD: CPT | Performed by: INTERNAL MEDICINE

## 2022-06-03 PROCEDURE — 99215 OFFICE O/P EST HI 40 MIN: CPT | Performed by: INTERNAL MEDICINE

## 2022-06-03 RX ORDER — METOPROLOL SUCCINATE 25 MG/1
25 TABLET, EXTENDED RELEASE ORAL DAILY
Qty: 90 TABLET | Refills: 3 | Status: SHIPPED | OUTPATIENT
Start: 2022-06-03

## 2022-06-03 RX ORDER — ASPIRIN 81 MG/1
81 TABLET ORAL DAILY
Qty: 90 TABLET | Refills: 1
Start: 2022-06-03

## 2022-06-03 NOTE — PATIENT INSTRUCTIONS
Plan:  [id number] [address] for surgery- recommend continuing Aspirin   Recommend treating you for coronary artery disease   Recommend starting enteric coated Aspirin 81 mg daily due to coronary artery disease. Recommend starting Toprol XL, Metoprolol 25 mg daily   Discussed an angiogram or working on medication therapy. She would like to go with medication therapy to start and will consider an angiogram if chest pain increases   Recommend a CT calcium score which is a test used as a screening tool for coronary artery disease. Cardiac medications reviewed including indications and pertinent side effects. Medication list updated at this visit.    Check blood pressure and heart rate at home a few times per week- keep a log with dates and times and bring to office visit   Regular exercise and following a healthy diet encouraged   Follow up with me in one month

## 2022-06-03 NOTE — PROGRESS NOTES
[address]   Cardiac Consultation    Referring Provider:  Blossom Records, DO     Chief Complaint   Patient presents with    Follow-up    Abnormal Test Results     discuss testing    Hyperlipidemia    Chest Pain     tightness       Jenny Kauffman   1955    History of Present Illness:   Jenny Kauffman is a 75 y.o. female patient who is here today for follow up and to review testing. She was initially seen as a new patient consult for preoperative cardiovascular clearance. She has a past medical history including asthma, GERD, HTN, HLD, and chronic back pain. She was recently seen by her PCP for clearance for left foot surgery with Dr.Amy Miller on 06/09/2022. She had reported periodic chest pain with assessment. EKG 05/26/22 Sinus Rhythm, Low voltage in precordial leads. Echocardiogram 5/27/2022 showed an EF of 55-60%. Stress test 6/1/2022 had an abnormal EKG response. Today she states she has chest pain that feels like a tightness in her chest. She states the pain is there more often than not. Can occur daily. She did not have worsening chest pain while she walked on the treadmill for her stress test. She states the chest pain does not seem to have gotten worse over past 6 months. Does not get worse w/ activity. Patient currently denies any weight gain, edema, palpitations, shortness of breath, dizziness, and syncope. She states her father had a stroke after having an angiogram. Blood pressure at home runs 120-130's for her SBP, rarely over 140. Past Medical History:   has a past medical history of Allergic rhinitis, Asthma, Chronic back pain, GERD (gastroesophageal reflux disease), Headache, Hearing loss, Hyperlipidemia, Kidney stones, Osteoarthritis, and Primary hypertension. Surgical History:   has a past surgical history that includes Total shoulder arthroplasty (Right, 2008) and Colonoscopy. Social History:   reports that she has never smoked.  She has never used smokeless tobacco. She reports current alcohol use. She reports that she does not use drugs. Family History:  family history is not on file. Father ~ CAD, stent placement. Home Medications:  Prior to Admission medications    Medication Sig Start Date End Date Taking? Authorizing Provider   melatonin 3 mg TABS tablet Take 3 mg by mouth nightly as needed   Yes Historical Provider, MD   Probiotic Product (PROBIOTIC-10 PO) Take by mouth daily   Yes Historical Provider, MD   Coenzyme Q10 (COQ10 PO) Take by mouth daily   Yes Historical Provider, MD   MULTIPLE VITAMIN PO Take by mouth daily   Yes Historical Provider, MD   amLODIPine (NORVASC) 2.5 MG tablet Take 2.5 mg by mouth daily   Yes Historical Provider, MD   atorvastatin (LIPITOR) 40 MG tablet Take 40 mg by mouth daily   Yes Historical Provider, MD   fluocinolone (DERMOTIC) 0.01 % OIL oil Place 2 drops in ear(s) 2 times daily as needed (prn) 2/24/22  Yes Bette Martinez DO   fluticasone (FLONASE) 50 MCG/ACT nasal spray 1 spray by Nasal route daily for 7 days  Patient not taking: Reported on 2/24/2022 2/2/18 2/24/22  Bette Martinez DO        Allergies:  Codeine     Review of Systems:   · Constitutional: there has been no unanticipated weight loss. There's been no change in energy level, sleep pattern, or activity level. · Eyes: No visual changes or diplopia. No scleral icterus. · ENT: No Headaches, hearing loss or vertigo. No mouth sores or sore throat. · Cardiovascular: Reviewed in HPI  · Respiratory: No cough or wheezing, no sputum production. No hematemesis. · Gastrointestinal: No abdominal pain, appetite loss, blood in stools. No change in bowel or bladder habits. · Genitourinary: No dysuria, trouble voiding, or hematuria. · Musculoskeletal:  No gait disturbance, weakness or joint complaints. · Integumentary: No rash or pruritis. · Neurological: No headache, diplopia, change in muscle strength, numbness or tingling.  No change in gait, balance, coordination, mood, affect, memory, mentation, behavior. · Psychiatric: No anxiety, no depression. · Endocrine: No malaise, fatigue or temperature intolerance. No excessive thirst, fluid intake, or urination. No tremor. · Hematologic/Lymphatic: No abnormal bruising or bleeding, blood clots or swollen lymph nodes. · Allergic/Immunologic: No nasal congestion or hives. Physical Examination:    Vitals:    06/03/22 0840   BP: 136/70   Pulse: 76   SpO2: 97%        Constitutional and General Appearance: NAD   Respiratory:  · Normal excursion and expansion without use of accessory muscles  · Resp Auscultation: Normal breath sounds without dullness  Cardiovascular:  · The apical impulses not displaced  · Heart tones are crisp and normal  · Cervical veins are not engorged  · The carotid upstroke is normal in amplitude and contour without delay or bruit  · Normal S1S2, No S3, No Murmur  · Peripheral pulses are symmetrical and full  · There is no clubbing, cyanosis of the extremities. · No edema  · Femoral Arteries: 2+ and equal  · Pedal Pulses: 2+ and equal   Abdomen:  · No masses or tenderness  · Liver/Spleen: No Abnormalities Noted  Neurological/Psychiatric:  · Alert and oriented in all spheres  · Moves all extremities well  · Exhibits normal gait balance and coordination  · No abnormalities of mood, affect, memory, mentation, or behavior are noted    EKG 05/26/22: Sinus  Rhythm, Low voltage in precordial leads. Echocardiogram 5/27/2022  Summary   Normal left ventricle size, wall thickness, and systolic function with an   estimated ejection fraction of 55-60%. No regional wall motion abnormalities   are seen. Diastolic filling parameters suggests normal diastolic function. Mild mitral regurgitation is present. Estimated pulmonary artery systolic pressure is 33 mmHg assuming a right   atrial pressure of 3 mmHg. Stress test 6/1/2022  Conclusions    Summary  Normal LV function.   There is normal isotope uptake at stress and rest. There is no evidence of  myocardial ischemia or scar on perfusion imaging. Abnormal EKG response. Overall, this would be considered an abnormal, low risk, study         Lab Core- 4/2022- , LDL 81    Assessment:   Abnormal stress test - normal myoview w/ abnormal EKG response. Low risk. Discussed   Chest pain - atypical/typical features. possible angina vs other noncardiac. No recent progression  HTN - suboptimal   HLD - stable  Former smoker  Family history of heart diease   Abnormal EKG  Pre-op CV eval - ok for surgery. Prefer reman on ASA and Toprol for surgery, if reasonable     Plan:  Ok for surgery- recommend continuing Aspirin   Recommend starting enteric coated Aspirin 81 mg daily. R/B/A/E discussed. Recommend starting Toprol XL, Metoprolol 25 mg daily   Based on risk factors and stress clarence, high suspicion for CAD. Symptoms are typical/atypical and stable. Stress test low risk. Discussed options for CTA, CT calcium score, cardiac cath. Discussed R/B/A/E. At this time patient has decided to defer additional testing and treat for presumed CAD. This is reasonable. Script provided for Ca score, as patient still considering this option. Cardiac medications reviewed including indications and pertinent side effects. Medication list updated at this visit. Check blood pressure and heart rate at home a few times per week- keep a log with dates and times and bring to office visit   Regular exercise and following a healthy diet encouraged   Follow up with me in one month     Scribe's attestation: This note was scribed in the presence of Dr. Yovani Jarvis M.D. By Dat Sesay RN    The scribes documentation has been prepared under my direction and personally reviewed by me in its entirety. I confirm that the note above accurately reflects all work, treatment, procedures, and medical decision making performed by me.     Dr. Yovani Jarvis MD    Thank you for allowing me to participate in the care of this individual.      Bi Gonzalez.  Barbara Beatty M.D., Rah Wheatley

## 2022-06-03 NOTE — LETTER
[address]  Phone: [phone]  Fax: [phone]    Regis Jin MD        Vanesa 3, 2022     Roberto Carlos Durand  [facility] [address]  [facility] [address]  1955    To whom it may concern,         Roberto Carlos Durand has no cardiac contraindications to surgery. It is recommended that she continue Aspirin and Toprol. Her stress test was low risk. If you have any questions or concerns, please don't hesitate to call.     Sincerely,        Regis Jin MD

## 2022-06-07 ENCOUNTER — ANESTHESIA EVENT (OUTPATIENT)
Dept: OPERATING ROOM | Age: 67
End: 2022-06-07
Payer: COMMERCIAL

## 2022-06-09 ENCOUNTER — ANESTHESIA (OUTPATIENT)
Dept: OPERATING ROOM | Age: 67
End: 2022-06-09
Payer: COMMERCIAL

## 2022-06-09 ENCOUNTER — HOSPITAL ENCOUNTER (OUTPATIENT)
Age: 67
Setting detail: OUTPATIENT SURGERY
Discharge: HOME | End: 2022-06-09
Attending: PODIATRIST | Admitting: PODIATRIST
Payer: COMMERCIAL

## 2022-06-09 VITALS
SYSTOLIC BLOOD PRESSURE: 120 MMHG | RESPIRATION RATE: 16 BRPM | OXYGEN SATURATION: 100 % | HEART RATE: 63 BPM | BODY MASS INDEX: 25.61 KG/M2 | WEIGHT: 150 LBS | TEMPERATURE: 98.2 F | DIASTOLIC BLOOD PRESSURE: 61 MMHG | HEIGHT: 64 IN

## 2022-06-09 DIAGNOSIS — M72.2 PLANTAR FASCIITIS: Primary | ICD-10-CM

## 2022-06-09 PROCEDURE — 3600000012 HC SURGERY LEVEL 2 ADDTL 15MIN: Performed by: PODIATRIST

## 2022-06-09 PROCEDURE — 2500000003 HC RX 250 WO HCPCS: Performed by: ANESTHESIOLOGY

## 2022-06-09 PROCEDURE — 3700000000 HC ANESTHESIA ATTENDED CARE: Performed by: PODIATRIST

## 2022-06-09 PROCEDURE — 7100000011 HC PHASE II RECOVERY - ADDTL 15 MIN: Performed by: PODIATRIST

## 2022-06-09 PROCEDURE — 2709999900 HC NON-CHARGEABLE SUPPLY: Performed by: PODIATRIST

## 2022-06-09 PROCEDURE — 7100000001 HC PACU RECOVERY - ADDTL 15 MIN: Performed by: PODIATRIST

## 2022-06-09 PROCEDURE — 2720000010 HC SURG SUPPLY STERILE: Performed by: PODIATRIST

## 2022-06-09 PROCEDURE — 6360000002 HC RX W HCPCS

## 2022-06-09 PROCEDURE — 2500000003 HC RX 250 WO HCPCS: Performed by: PODIATRIST

## 2022-06-09 PROCEDURE — 3700000001 HC ADD 15 MINUTES (ANESTHESIA): Performed by: PODIATRIST

## 2022-06-09 PROCEDURE — 3600000002 HC SURGERY LEVEL 2 BASE: Performed by: PODIATRIST

## 2022-06-09 PROCEDURE — 6360000002 HC RX W HCPCS: Performed by: PODIATRIST

## 2022-06-09 PROCEDURE — 7100000010 HC PHASE II RECOVERY - FIRST 15 MIN: Performed by: PODIATRIST

## 2022-06-09 PROCEDURE — 2580000003 HC RX 258: Performed by: ANESTHESIOLOGY

## 2022-06-09 PROCEDURE — 6370000000 HC RX 637 (ALT 250 FOR IP)

## 2022-06-09 PROCEDURE — 2500000003 HC RX 250 WO HCPCS

## 2022-06-09 PROCEDURE — 7100000000 HC PACU RECOVERY - FIRST 15 MIN: Performed by: PODIATRIST

## 2022-06-09 RX ORDER — ONDANSETRON 2 MG/ML
INJECTION INTRAMUSCULAR; INTRAVENOUS PRN
Status: DISCONTINUED | OUTPATIENT
Start: 2022-06-09 | End: 2022-06-09 | Stop reason: SDUPTHER

## 2022-06-09 RX ORDER — FENTANYL CITRATE 50 UG/ML
INJECTION, SOLUTION INTRAMUSCULAR; INTRAVENOUS PRN
Status: DISCONTINUED | OUTPATIENT
Start: 2022-06-09 | End: 2022-06-09 | Stop reason: SDUPTHER

## 2022-06-09 RX ORDER — SODIUM CHLORIDE 9 MG/ML
250 INJECTION, SOLUTION INTRAVENOUS PRN
Status: DISCONTINUED | OUTPATIENT
Start: 2022-06-09 | End: 2022-06-09 | Stop reason: HOSPADM

## 2022-06-09 RX ORDER — ENOXAPARIN SODIUM 100 MG/ML
40 INJECTION SUBCUTANEOUS DAILY
Qty: 14 EACH | Refills: 0 | Status: SHIPPED | OUTPATIENT
Start: 2022-06-09

## 2022-06-09 RX ORDER — OXYCODONE HYDROCHLORIDE 5 MG/1
10 TABLET ORAL PRN
Status: DISCONTINUED | OUTPATIENT
Start: 2022-06-09 | End: 2022-06-09 | Stop reason: HOSPADM

## 2022-06-09 RX ORDER — SCOLOPAMINE TRANSDERMAL SYSTEM 1 MG/1
PATCH, EXTENDED RELEASE TRANSDERMAL PRN
Status: DISCONTINUED | OUTPATIENT
Start: 2022-06-09 | End: 2022-06-09 | Stop reason: SDUPTHER

## 2022-06-09 RX ORDER — SODIUM CHLORIDE 0.9 % (FLUSH) 0.9 %
5-40 SYRINGE (ML) INJECTION EVERY 12 HOURS SCHEDULED
Status: DISCONTINUED | OUTPATIENT
Start: 2022-06-09 | End: 2022-06-09 | Stop reason: HOSPADM

## 2022-06-09 RX ORDER — SODIUM CHLORIDE, SODIUM LACTATE, POTASSIUM CHLORIDE, CALCIUM CHLORIDE 600; 310; 30; 20 MG/100ML; MG/100ML; MG/100ML; MG/100ML
INJECTION, SOLUTION INTRAVENOUS CONTINUOUS
Status: DISCONTINUED | OUTPATIENT
Start: 2022-06-09 | End: 2022-06-09 | Stop reason: HOSPADM

## 2022-06-09 RX ORDER — PROMETHAZINE HYDROCHLORIDE 25 MG/1
25 TABLET ORAL EVERY 8 HOURS PRN
Qty: 15 TABLET | Refills: 0 | Status: SHIPPED | OUTPATIENT
Start: 2022-06-09 | End: 2022-06-14

## 2022-06-09 RX ORDER — ONDANSETRON 2 MG/ML
4 INJECTION INTRAMUSCULAR; INTRAVENOUS
Status: DISCONTINUED | OUTPATIENT
Start: 2022-06-09 | End: 2022-06-09 | Stop reason: HOSPADM

## 2022-06-09 RX ORDER — SODIUM CHLORIDE 0.9 % (FLUSH) 0.9 %
5-40 SYRINGE (ML) INJECTION PRN
Status: DISCONTINUED | OUTPATIENT
Start: 2022-06-09 | End: 2022-06-09 | Stop reason: HOSPADM

## 2022-06-09 RX ORDER — BUPIVACAINE HYDROCHLORIDE 5 MG/ML
INJECTION, SOLUTION EPIDURAL; INTRACAUDAL PRN
Status: DISCONTINUED | OUTPATIENT
Start: 2022-06-09 | End: 2022-06-09 | Stop reason: ALTCHOICE

## 2022-06-09 RX ORDER — HYDROCODONE BITARTRATE AND ACETAMINOPHEN 5; 325 MG/1; MG/1
1 TABLET ORAL EVERY 6 HOURS PRN
Qty: 20 TABLET | Refills: 0 | Status: SHIPPED | OUTPATIENT
Start: 2022-06-09 | End: 2022-06-14

## 2022-06-09 RX ORDER — DIPHENHYDRAMINE HYDROCHLORIDE 50 MG/ML
12.5 INJECTION INTRAMUSCULAR; INTRAVENOUS
Status: DISCONTINUED | OUTPATIENT
Start: 2022-06-09 | End: 2022-06-09 | Stop reason: HOSPADM

## 2022-06-09 RX ORDER — LABETALOL HYDROCHLORIDE 5 MG/ML
5 INJECTION, SOLUTION INTRAVENOUS EVERY 10 MIN PRN
Status: DISCONTINUED | OUTPATIENT
Start: 2022-06-09 | End: 2022-06-09 | Stop reason: HOSPADM

## 2022-06-09 RX ORDER — GLYCOPYRROLATE 0.2 MG/ML
INJECTION INTRAMUSCULAR; INTRAVENOUS PRN
Status: DISCONTINUED | OUTPATIENT
Start: 2022-06-09 | End: 2022-06-09 | Stop reason: SDUPTHER

## 2022-06-09 RX ORDER — MEPERIDINE HYDROCHLORIDE 25 MG/ML
12.5 INJECTION INTRAMUSCULAR; INTRAVENOUS; SUBCUTANEOUS EVERY 5 MIN PRN
Status: DISCONTINUED | OUTPATIENT
Start: 2022-06-09 | End: 2022-06-09 | Stop reason: RX

## 2022-06-09 RX ORDER — LIDOCAINE HYDROCHLORIDE 10 MG/ML
INJECTION, SOLUTION INFILTRATION; PERINEURAL PRN
Status: DISCONTINUED | OUTPATIENT
Start: 2022-06-09 | End: 2022-06-09 | Stop reason: SDUPTHER

## 2022-06-09 RX ORDER — PROPOFOL 10 MG/ML
INJECTION, EMULSION INTRAVENOUS PRN
Status: DISCONTINUED | OUTPATIENT
Start: 2022-06-09 | End: 2022-06-09 | Stop reason: SDUPTHER

## 2022-06-09 RX ORDER — OXYCODONE HYDROCHLORIDE 5 MG/1
5 TABLET ORAL PRN
Status: DISCONTINUED | OUTPATIENT
Start: 2022-06-09 | End: 2022-06-09 | Stop reason: HOSPADM

## 2022-06-09 RX ORDER — BUPIVACAINE HYDROCHLORIDE 5 MG/ML
INJECTION, SOLUTION EPIDURAL; INTRACAUDAL
Status: DISCONTINUED
Start: 2022-06-09 | End: 2022-06-09 | Stop reason: HOSPADM

## 2022-06-09 RX ORDER — SCOLOPAMINE TRANSDERMAL SYSTEM 1 MG/1
PATCH, EXTENDED RELEASE TRANSDERMAL
Status: COMPLETED
Start: 2022-06-09 | End: 2022-06-09

## 2022-06-09 RX ADMIN — Medication 2000 MG: at 10:56

## 2022-06-09 RX ADMIN — GLYCOPYRROLATE 0.2 MG: 0.2 INJECTION, SOLUTION INTRAMUSCULAR; INTRAVENOUS at 11:18

## 2022-06-09 RX ADMIN — FENTANYL CITRATE 50 MCG: 50 INJECTION INTRAMUSCULAR; INTRAVENOUS at 11:00

## 2022-06-09 RX ADMIN — PROPOFOL 150 MG: 10 INJECTION, EMULSION INTRAVENOUS at 10:53

## 2022-06-09 RX ADMIN — PROPOFOL 50 MG: 10 INJECTION, EMULSION INTRAVENOUS at 10:57

## 2022-06-09 RX ADMIN — PROPOFOL 50 MG: 10 INJECTION, EMULSION INTRAVENOUS at 11:00

## 2022-06-09 RX ADMIN — FENTANYL CITRATE 50 MCG: 50 INJECTION INTRAMUSCULAR; INTRAVENOUS at 10:55

## 2022-06-09 RX ADMIN — LIDOCAINE HYDROCHLORIDE 60 MG: 10 INJECTION, SOLUTION INFILTRATION; PERINEURAL at 10:53

## 2022-06-09 RX ADMIN — SCOPOLAMINE 1 PATCH: 1 PATCH TRANSDERMAL at 10:51

## 2022-06-09 RX ADMIN — SODIUM CHLORIDE, POTASSIUM CHLORIDE, SODIUM LACTATE AND CALCIUM CHLORIDE: 600; 310; 30; 20 INJECTION, SOLUTION INTRAVENOUS at 09:06

## 2022-06-09 RX ADMIN — LIDOCAINE HYDROCHLORIDE 0.1 ML: 10 INJECTION, SOLUTION EPIDURAL; INFILTRATION; INTRACAUDAL; PERINEURAL at 09:06

## 2022-06-09 RX ADMIN — ONDANSETRON 4 MG: 2 INJECTION INTRAMUSCULAR; INTRAVENOUS at 11:00

## 2022-06-09 ASSESSMENT — PAIN - FUNCTIONAL ASSESSMENT
PAIN_FUNCTIONAL_ASSESSMENT: 0-10
PAIN_FUNCTIONAL_ASSESSMENT: PREVENTS OR INTERFERES SOME ACTIVE ACTIVITIES AND ADLS

## 2022-06-09 ASSESSMENT — PAIN SCALES - GENERAL
PAINLEVEL_OUTOF10: 0

## 2022-06-09 ASSESSMENT — PAIN DESCRIPTION - DESCRIPTORS: DESCRIPTORS: SHARP;STABBING

## 2022-06-09 NOTE — BRIEF OP NOTE
Brief Postoperative Note      Patient: Elba Hankins  YOB: 1955  MRN: [id number]    Date of Procedure: 6/9/2022    Pre-Op Diagnosis: PLANTAR FASCIITIS LEFT, CALCANEAL SPUR LEFT, PLANTAR FASCIAL TEAR LEFT, PAIN IN LEFT    Post-Op Diagnosis: Same       Procedure(s):  ENDOSCOPIC PLANTAR FASCIOTOMY LEFT FOOT, APPLICATION OF POSTERIOR SPLINT LEFT FOOT    Surgeon(s):  Kenay Snyder DPM  Assistant:  Surgical Assistant: Gregorio Ch    Anesthesia: eneral  Injectables: 10cc 0.5% Marcaine plain  Hemostasis: PAT left at 250mmHg for 15 minutes  Materials: 4-0 Nylon  Estimated Blood Loss (mL): less than 5  Complications: None  Specimens: none  Implants: none    Drains: * No LDAs found *  Findings:  Thickened plantar fascial band    Electronically signed by Nate Barone DPM on 6/9/2022 at 12:30 PM

## 2022-06-09 NOTE — PROGRESS NOTES
Discharge  instructions reviewed. Pt and family verbalize understanding with no further questions. VSS. Pt discharged via [facility] [address] to car. Assessment unchanged.

## 2022-06-09 NOTE — ANESTHESIA PRE PROCEDURE
Department of Anesthesiology  Preprocedure Note       Name:  Tessy Shane   Age:  75 y.o.  :  1955                                          MRN:  [id number]         Date:  2022      Surgeon: Alka Monaco):  Kenya De León DPM    Procedure: Procedure(s):  ENDOSCOPIC PLANTAR FASCIOTOMY LEFT FOOT, APPLICATION OF POSTERIOR SPLINT LEFT FOOT    Medications prior to admission:   Prior to Admission medications    Medication Sig Start Date End Date Taking? Authorizing Provider   aspirin EC 81 MG EC tablet Take 1 tablet by mouth daily 6/3/22   Pallavi Marsh, MD   metoprolol succinate (TOPROL XL) 25 MG extended release tablet Take 1 tablet by mouth daily 6/3/22   Pallavi Marsh, MD   melatonin 3 mg TABS tablet Take 3 mg by mouth nightly as needed    Historical Provider, MD   Probiotic Product (PROBIOTIC-10 PO) Take by mouth daily    Historical Provider, MD   Coenzyme Q10 (COQ10 PO) Take by mouth daily    Historical Provider, MD   MULTIPLE VITAMIN PO Take by mouth daily    Historical Provider, MD   amLODIPine (NORVASC) 2.5 MG tablet Take 2.5 mg by mouth daily    Historical Provider, MD   atorvastatin (LIPITOR) 40 MG tablet Take 40 mg by mouth daily    Historical Provider, MD   fluocinolone (DERMOTIC) 0.01 % OIL oil Place 2 drops in ear(s) 2 times daily as needed (prn) 22   Odessa Almaraz DO   fluticasone [facility]) 50 MCG/ACT nasal spray 1 spray by Nasal route daily for 7 days  Patient not taking: Reported on 2022  Odessa Pulse DO       Current medications:    Current Facility-Administered Medications   Medication Dose Route Frequency Provider Last Rate Last Admin    ceFAZolin (ANCEF) 2000 mg in sterile water 20 mL IV syringe  2,000 mg IntraVENous Once Kenya De León DPM        lactated ringers infusion   IntraVENous Continuous Meera Rae MD 50 mL/hr at 22 New Bag at 22       Allergies:     Allergies   Allergen Reactions  Codeine Nausea And Vomiting     intolerance       Problem List:    Patient Active Problem List   Diagnosis Code    Precordial pain R07.2    Primary hypertension I10    Mixed hyperlipidemia E78.2    Gastroesophageal reflux disease without esophagitis K21.9    Preop cardiovascular exam Z01.810    Abnormal stress test R94.39       Past Medical History:        Diagnosis Date    Allergic rhinitis     Asthma     Chronic back pain     GERD (gastroesophageal reflux disease)     Headache     Hearing loss     Hyperlipidemia     Kidney stones 2015    Osteoarthritis     Primary hypertension 5/27/2022       Past Surgical History:        Procedure Laterality Date    COLONOSCOPY      SHOULDER ARTHROPLASTY Right 2008       Social History:    Social History     Tobacco Use    Smoking status: Never Smoker    Smokeless tobacco: Never Used   Substance Use Topics    Alcohol use: Yes     Comment: occ                                Counseling given: Not Answered      Vital Signs (Current):   Vitals:    05/25/22 1206 06/09/22 0853   BP:  137/72   Pulse:  66   Resp:  19   Temp:  97.5 °F (36.4 °C)   TempSrc:  Temporal   SpO2:  99%   Weight: 150 lb (68 kg) 150 lb (68 kg)   Height:  5' 4\" (1.626 m)                                              BP Readings from Last 3 Encounters:   06/09/22 137/72   06/03/22 136/70   05/27/22 (!) 145/75       NPO Status: Time of last liquid consumption: 2330                        Time of last solid consumption: 2200                        Date of last liquid consumption: 06/08/22                        Date of last solid food consumption: 06/08/22    BMI:   Wt Readings from Last 3 Encounters:   06/09/22 150 lb (68 kg)   06/03/22 156 lb 12.8 oz (71.1 kg)   05/27/22 156 lb (70.8 kg)     Body mass index is 25.75 kg/m².     CBC: No results found for: WBC, RBC, HGB, HCT, MCV, RDW, PLT    CMP: No results found for: NA, K, CL, CO2, BUN, CREATININE, GFRAA, AGRATIO, LABGLOM, GLUCOSE, GLU, PROT, CALCIUM, BILITOT, ALKPHOS, AST, ALT    POC Tests: No results for input(s): POCGLU, POCNA, POCK, POCCL, POCBUN, POCHEMO, POCHCT in the last 72 hours. Coags: No results found for: PROTIME, INR, APTT    HCG (If Applicable): No results found for: PREGTESTUR, PREGSERUM, HCG, HCGQUANT     ABGs: No results found for: PHART, PO2ART, QAK3YEW, QWV7TUH, BEART, K3WJUSED     Type & Screen (If Applicable):  No results found for: LABABO, LABRH    Drug/Infectious Status (If Applicable):  No results found for: HIV, HEPCAB    COVID-19 Screening (If Applicable): No results found for: COVID19        Anesthesia Evaluation  Patient summary reviewed no history of anesthetic complications:   Airway: Mallampati: II  TM distance: >3 FB   Neck ROM: full  Mouth opening: > = 3 FB   Dental: normal exam         Pulmonary:normal exam  breath sounds clear to auscultation  (+) asthma:     (-) COPD and sleep apnea                           Cardiovascular:  Exercise tolerance: good (>4 METS),   (+) hypertension:,     (-) CAD,  angina and  [place]OR[place]O      Rhythm: regular  Rate: normal                    Neuro/Psych:   (+) headaches:,    (-) seizures and TIA           GI/Hepatic/Renal:   (+) GERD: well controlled,      (-) liver disease and no renal disease       Endo/Other:        (-) diabetes mellitus               Abdominal:             Vascular: Other Findings:        Pre-Operative Diagnosis: Plantar fasciitis [M72.2]; Calcaneal spur of left foot [M77.32]; Spontaneous rupture of flexor tendons, left ankle and foot [M66.372]    75 y.o.   BMI:  Body mass index is 25.75 kg/m².      Vitals:    05/25/22 1206 06/09/22 0853   BP:  137/72   Pulse:  66   Resp:  19   Temp:  97.5 °F (36.4 °C)   TempSrc:  Temporal   SpO2:  99%   Weight: 150 lb (68 kg) 150 lb (68 kg)   Height:  5' 4\" (1.626 m)       Allergies   Allergen Reactions    Codeine Nausea And Vomiting     intolerance       Social History     Tobacco Use    Smoking status: Never Smoker    Smokeless tobacco: Never Used   Substance Use Topics    Alcohol use: Yes     Comment: occ       LABS:    CBC  No results found for: WBC, HGB, HCT, PLT  RENAL  No results found for: NA, K, CL, CO2, BUN, CREATININE, GLUCOSE  COAGS  No results found for: PROTIME, INR, APTT          Anesthesia Plan      general     ASA 2     (I discussed with the patient the risks and benefits of PIV, anesthesia, IV Narcotics, PACU. All questions were answered the patient agrees with the plan and wishes to proceed)  Induction: intravenous. Had stress test on 6/1. Normal imaging but abnormal EKG. Was considered a low risk study. Pt with CP but denies and change or increase in pain. Ok to proceed since stress test low risk.         Ian Juarez MD   6/9/2022

## 2022-06-09 NOTE — H&P
I have examined the patient and reviewed the original history and physical completed and find no relevant changes. The nature of the procedure, possible complications, alternative forms of therapy, post-op course, and post-op goals have been explained to patient/patient family. All questions have been answered. The consent has been signed. Patient's surgical site has been marked.      Pauline Yi DPM   6/9/2022 10:47 AM

## 2022-06-09 NOTE — OP NOTE
[address]                 [address] [address]                                OPERATIVE REPORT    PATIENT NAME: Marilyn Keller                      :        1955  MED REC NO:   [id number]                          ROOM:  ACCOUNT NO:   [de-identified]                           ADMIT DATE: 2022  PROVIDER:     Shantell Loving DPM    DATE OF PROCEDURE:  2022    LOCATION:  [facility]. PREOPERATIVE DIAGNOSES:  1.  Plantar fasciitis, left. 2.  Calcaneal spur, left. 3.  Plantar fascial tear, left. 4.  Pain in left foot. POSTOPERATIVE DIAGNOSES:  1.  Plantar fasciitis, left. 2.  Calcaneal spur, left. 3.  Plantar fascial tear, left. 4.  Pain in left foot. OPERATION PERFORMED:  1. Endoscopic plantar fasciotomy, left foot. 2.  Application of posterior splint, left. SURGEON:  Kenya Miller DPM    SURGICAL ASSISTANT:  Brian Mcclellan. ANESTHESIA:  General.    INJECTABLES:  10 mL of 0.5% Marcaine plain. HEMOSTASIS:  Pneumatic ankle tourniquet, left, at 250 mmHg for 15  minutes. MATERIALS:  4-0 nylon. BLOOD LOSS:  Less than 5 mL. COMPLICATIONS:  None. SPECIMENS:  None. IMPLANTS:  None. DRAINS:  None. FINDINGS:  Thickened plantar fascial band. INDICATIONS FOR PROCEDURE:  The patient is a 26-year-old female who has  been seen and treated in my office for sometime for painful plantar  fasciitis on the left foot. Prior to seeing me in 2021, she had a  history of approximately three to four years of pain. She had failed  all conservative treatment and therefore an MRI was ordered, which did  confirm the presence of a central cord tear of the plantar fascia. She  attempted CAM walker boot immobilization, however, pain persisted. For  that reason, surgical correction was discussed including endoscopic  plantar fasciotomy of the left foot.   All benefits, risks,  complications, and alternatives were discussed with the patient in  detail and she did opt for surgical correction. Consent was signed and  in the chart. Labs and radiographs were reviewed prior to surgery. N.p.o. status confirmed since midnight. No guarantees were given or  implied. PROCEDURE IN DETAIL:  Under mild IV sedation, after receiving 2 gm of IV  Ancef, the patient was brought into the operating room and placed on the  operating table in a supine position. Following induction of general  anesthesia, a pneumatic ankle tourniquet was placed about the left ankle  and the left lower extremity was scrubbed, prepped, and draped in the  usual aseptic manner. A time-out was then completed. The left lower  extremity was then elevated and exsanguinated and a pneumatic ankle  tourniquet was inflated at 250 mmHg. At this time, attention was directed to the plantar medial aspect of the  left heel where approximately 1 cm horizontal incision was placed via  15-blade through the level of the skin located approximately 5.5 cm  distal from the posterior aspect of the heel and 2cm superior from the inferior aspect of the heel. Deeper dissection was  carried out via hemostat and then a fascial elevator was inserted from  medial to lateral to elevate the plantar fascia from underlying fat pad. The fascial elevator was removed and a trocar was slotted, and cannula  was then inserted from medial to lateral.  Tenting was noted on the  lateral skin and at this location, a stab incision was placed via  15-blade and the cannula was placed through the skin incision. The  slotted cannula was left in place. The trocar was removed and the  camera was inserted from lateral to medial to inspect the plantar  fascia. The plantar fascia was then palpated with a blunt probe and  found to be very taut.   Next, utilizing a knife blade that was inserted  medially approximately two-thirds of the medial plantar fascial band was  released under direct visualization of the scope. When this was  completed, the blunt probe was utilized to ensure no remaining plantar  fascial fibers were intact at this location. The camera was removed. The area was flushed with copious amounts of sterile normal saline. The  trocar was inserted back into the slotted cannula and all  instrumentation removed in total.  Incision sites medial and lateral  heel were reapproximated with 4-0 nylon. A postoperative injection was  administered about the site consisting of 10 mL of 0.5% Marcaine plain. A postoperative dressing was applied with Xeroform, 4x4's, Jayson, Ace  wrap, stockinette, cast padding, and a 4 x 15 posterior splint was  applied in standard fashion. The pneumatic ankle tourniquet was  deflated for a total of 15 minutes. The patient tolerated this procedure and anesthesia well and was sent to  Recovery with vital signs stable and vascular status intact to all  digits to the left foot. Following a period of postoperative  monitoring, she will be discharged home with the following instructions:  She is to rest, ice, and elevate the left lower extremity. She is to  leave the postoperative dressing clean, dry, and intact, and is not to  be removed. She will remain nonweightbearing on the left lower  extremity with crutches. She has been given the following  prescriptions; Norco 5/325 mg tablets one p.o. q.6 hours p.r.n. pain,  Phenergan 25 mg tablet one p.o. q.8 hours p.r.n. nausea, she has also  been prescribed two blood thinners and we will have the pharmacist only  fill one, which ever is covered by her insurance, Lovenox versus Eliquis  2.5 mg b.i.d. She has followup scheduled with me in my office next week  and may contact the office sooner with any questions, problems, or  concerns.         MACI Correia DPM    D: 06/09/2022 13:56:05       T: 06/09/2022 15:58:50     [id number]  Job#: [id number]     Doc#: [id number]    CC:

## 2022-06-09 NOTE — PROGRESS NOTES
Patient is able to demonstrate the ability to move from a reclining position to an upright position within the recliner. Pt. checked in for procedure. Assessment complete. IV started. Safety check list complete. Family at patient's side.

## 2022-06-09 NOTE — ANESTHESIA POSTPROCEDURE EVALUATION
Department of Anesthesiology  Postprocedure Note    Patient: Harlan Hi  MRN: [id number]  YOB: 1955  Date of evaluation: 6/9/2022  Time:  12:05 PM     Procedure Summary     Date: 06/09/22 Room / Location: [address] [place] [address] OR 03 / [facility]    Anesthesia Start: 1049 Anesthesia Stop: 6120    Procedure: ENDOSCOPIC PLANTAR FASCIOTOMY LEFT FOOT, APPLICATION OF POSTERIOR SPLINT LEFT FOOT (Left Foot) Diagnosis:       Plantar fasciitis      Calcaneal spur of left foot      Spontaneous rupture of flexor tendons, left ankle and foot      (PLANTAR FASCIITIS LEFT, CALCANEAL SPUR LEFT, PLANTAR FASCIAL TEAR LEFT, PAIN IN LEFT)    Surgeons: Geeta Cordero DPM Responsible Provider: Trav Gonzalez MD    Anesthesia Type: general ASA Status: 2          Anesthesia Type: No value filed. Socorro Phase I: Socorro Score: 10    Socorro Phase II: Socorro Score: 10    Last vitals: Reviewed and per EMR flowsheets.        Anesthesia Post Evaluation    Comments: Postoperative Anesthesia Note    Name:    Harlan Hi  MRN:      [id number]    Patient Vitals in the past 12 hrs:  06/09/22 1157, BP:104/61, Temp:98.2 °F (36.8 °C), Temp src:Temporal, Pulse:68, Resp:16, SpO2:96 %  06/09/22 1141, BP:(!) 101/58, Pulse:72, Resp:16, SpO2:99 %  06/09/22 1137, BP:(!) 91/50, Pulse:73, Resp:14, SpO2:99 %  06/09/22 1132, BP:(!) 90/51, Pulse:72, Resp:14, SpO2:99 %  06/09/22 1127, BP:(!) 88/51, Temp:98 °F (36.7 °C), Temp src:Temporal, Pulse:77, Resp:14, SpO2:98 %  06/09/22 0853, BP:137/72, Temp:97.5 °F (36.4 °C), Temp src:Temporal, Pulse:66, Resp:19, SpO2:99 %, Height:5' 4\" (1.626 m), Weight:150 lb (68 kg)     LABS:    CBC  No results found for: WBC, HGB, HCT, PLT  RENAL  No results found for: NA, K, CL, CO2, BUN, CREATININE, GLUCOSE  COAGS  No results found for: PROTIME, INR, APTT    Intake & Output:  @68AAYL@    Nausea & Vomiting:  No    Level of Consciousness:  Awake    Pain Assessment:  Adequate analgesia    Anesthesia Complications:  No apparent anesthetic complications    SUMMARY      Vital signs stable  OK to discharge from Stage I post anesthesia care.   Care transferred from Anesthesiology department on discharge from perioperative area

## 2022-07-03 PROBLEM — Z01.810 PREOP CARDIOVASCULAR EXAM: Status: RESOLVED | Noted: 2022-05-27 | Resolved: 2022-07-03

## 2022-09-01 LAB
ALBUMIN SERPL-MCNC: 4.1 G/DL (ref 3.8–4.8)
ALP BLD-CCNC: 92 IU/L (ref 44–121)
ALT SERPL-CCNC: 27 IU/L (ref 0–32)
AST SERPL-CCNC: 19 IU/L (ref 0–40)
BILIRUB SERPL-MCNC: 0.4 MG/DL (ref 0–1.2)
BILIRUBIN DIRECT: 0.12 MG/DL (ref 0–0.4)
HBA1C MFR BLD: 5.8 % (ref 4.8–5.6)
TOTAL PROTEIN: 6.8 G/DL (ref 6–8.5)

## 2022-09-13 ENCOUNTER — APPOINTMENT (RX ONLY)
Dept: URBAN - METROPOLITAN AREA CLINIC 377 | Facility: CLINIC | Age: 67
Setting detail: DERMATOLOGY
End: 2022-09-13

## 2022-09-13 DIAGNOSIS — L72.0 EPIDERMAL CYST: ICD-10-CM | Status: INADEQUATELY CONTROLLED

## 2022-09-13 DIAGNOSIS — L82.1 OTHER SEBORRHEIC KERATOSIS: ICD-10-CM | Status: INADEQUATELY CONTROLLED

## 2022-09-13 DIAGNOSIS — L91.8 OTHER HYPERTROPHIC DISORDERS OF THE SKIN: ICD-10-CM | Status: INADEQUATELY CONTROLLED

## 2022-09-13 PROCEDURE — ? SKIN TAG REMOVAL (COSMETIC)

## 2022-09-13 PROCEDURE — ? LIQUID NITROGEN (COSMETIC)

## 2022-09-13 PROCEDURE — 99202 OFFICE O/P NEW SF 15 MIN: CPT

## 2022-09-13 PROCEDURE — ? COUNSELING

## 2022-09-13 ASSESSMENT — LOCATION DETAILED DESCRIPTION DERM
LOCATION DETAILED: LEFT CENTRAL MALAR CHEEK
LOCATION DETAILED: RIGHT INFERIOR LATERAL NECK
LOCATION DETAILED: LEFT POSTERIOR AXILLA
LOCATION DETAILED: SUBXIPHOID
LOCATION DETAILED: LEFT INFRAMAMMARY CREASE (INNER QUADRANT)
LOCATION DETAILED: RIGHT AXILLARY VAULT
LOCATION DETAILED: RIGHT INFRAMAMMARY CREASE (INNER QUADRANT)
LOCATION DETAILED: LEFT INFERIOR LATERAL NECK
LOCATION DETAILED: RIGHT POSTERIOR AXILLA
LOCATION DETAILED: LEFT AXILLARY VAULT
LOCATION DETAILED: LEFT RIB CAGE

## 2022-09-13 ASSESSMENT — LOCATION SIMPLE DESCRIPTION DERM
LOCATION SIMPLE: RIGHT ANTERIOR NECK
LOCATION SIMPLE: RIGHT POSTERIOR AXILLA
LOCATION SIMPLE: RIGHT AXILLARY VAULT
LOCATION SIMPLE: LEFT ANTERIOR NECK
LOCATION SIMPLE: LEFT AXILLARY VAULT
LOCATION SIMPLE: LEFT BREAST
LOCATION SIMPLE: RIGHT BREAST
LOCATION SIMPLE: ABDOMEN
LOCATION SIMPLE: LEFT CHEEK
LOCATION SIMPLE: LEFT POSTERIOR AXILLA

## 2022-09-13 ASSESSMENT — LOCATION ZONE DERM
LOCATION ZONE: AXILLAE
LOCATION ZONE: FACE
LOCATION ZONE: NECK
LOCATION ZONE: TRUNK

## 2022-09-13 NOTE — PROCEDURE: LIQUID NITROGEN (COSMETIC)
Post-Care Instructions: I reviewed with the patient in detail post-care instructions. Patient is to wear sunprotection, and avoid picking at any of the treated lesions. Pt may apply Vaseline to crusted or scabbing areas.
Render Post-Care Instructions In Note?: yes
Billing Information: Bill by Static Price
Price (Use Numbers Only, No Special Characters Or $): 75.00
Consent: The patient's consent was obtained including but not limited to risks of crusting, scabbing, blistering, scarring, darker or lighter pigmentary change, recurrence, incomplete removal and infection. The patient understands that the procedure is cosmetic in nature and is not covered by insurance.
Spray Paint Text: The liquid nitrogen was applied to the skin utilizing a spray paint frosting technique.
Spray Paint Technique: No
Detail Level: Detailed

## 2022-09-13 NOTE — PROCEDURE: SKIN TAG REMOVAL (COSMETIC)
Price (Use Numbers Only, No Special Characters Or $): 75.00
Consent: Written consent obtained and the risks of skin tag removal was reviewed with the patient including but not limited to bleeding, pigmentary change, infection, pain, and remote possibility of scarring.
Anesthesia Volume In Cc: 0.5
Anesthesia Type: 1% lidocaine with epinephrine
Removed With: liquid nitrogen
Detail Level: Zone

## 2022-09-13 NOTE — PROCEDURE: MIPS QUALITY
Detail Level: Generalized
Quality 226: Preventive Care And Screening: Tobacco Use: Screening And Cessation Intervention: Patient screened for tobacco use and is an ex/non-smoker
Quality 431: Preventive Care And Screening: Unhealthy Alcohol Use - Screening: Patient not identified as an unhealthy alcohol user when screened for unhealthy alcohol use using a systematic screening method
Quality 130: Documentation Of Current Medications In The Medical Record: Current Medications Documented
Quality 110: Preventive Care And Screening: Influenza Immunization: Influenza Immunization previously received during influenza season

## 2022-10-01 DIAGNOSIS — R73.03 PREDIABETES: Primary | ICD-10-CM

## 2022-12-08 ENCOUNTER — COMMUNITY OUTREACH (OUTPATIENT)
Dept: FAMILY MEDICINE CLINIC | Age: 67
End: 2022-12-08

## 2022-12-08 NOTE — ADDENDUM NOTE
Addended byHector Hector on: 12/8/2022 08:17 AM     Modules accepted: Orders, Level of Service, SmartSet

## 2022-12-08 NOTE — PROGRESS NOTES
Patient's HM shows they are overdue for Mammogram, Colorectal Screening and Dexa. Care Everywhere and  files searched.  updated with 2021 colonoscopy. Dexa ordered, not yet completed.

## 2023-02-21 ENCOUNTER — PATIENT MESSAGE (OUTPATIENT)
Dept: FAMILY MEDICINE CLINIC | Age: 68
End: 2023-02-21

## 2023-03-13 NOTE — TELEPHONE ENCOUNTER
----- Message from Chris Montes sent at 3/13/2023  1:43 PM EDT -----  Subject: Message to Provider    QUESTIONS  Information for Provider? Patient states she received a message from the   office telling her to check my-chart for a message but she doesn't have a   my-chart. she would like to know what the message was in reference too?  ---------------------------------------------------------------------------  --------------  Jamarcus TURNER  [phone]; OK to leave message on voicemail  ---------------------------------------------------------------------------  --------------  SCRIPT ANSWERS  Relationship to Patient?  Self

## 2023-03-13 NOTE — TELEPHONE ENCOUNTER
Patient declined my scheduling a mammogram for her. Patient informed us that she is not on medicare. Changed appt to physical (6/6/23).

## 2023-03-13 NOTE — TELEPHONE ENCOUNTER
----- Message from Peggy Damon sent at 3/13/2023  1:43 PM EDT -----  Subject: Message to Provider    QUESTIONS  Information for Provider? Patient states she received a message from the   office telling her to check my-chart for a message but she doesn't have a   my-chart. she would like to know what the message was in reference too?  ---------------------------------------------------------------------------  --------------  Demi Mackenzie INFO  [phone]; OK to leave message on voicemail  ---------------------------------------------------------------------------  --------------  SCRIPT ANSWERS  Relationship to Patient?  Self

## 2023-04-07 ENCOUNTER — TELEPHONE (OUTPATIENT)
Dept: FAMILY MEDICINE CLINIC | Age: 68
End: 2023-04-07

## 2023-04-07 ENCOUNTER — OFFICE VISIT (OUTPATIENT)
Dept: CARDIOLOGY CLINIC | Age: 68
End: 2023-04-07
Payer: COMMERCIAL

## 2023-04-07 VITALS
BODY MASS INDEX: 28.34 KG/M2 | SYSTOLIC BLOOD PRESSURE: 146 MMHG | OXYGEN SATURATION: 98 % | HEART RATE: 69 BPM | DIASTOLIC BLOOD PRESSURE: 80 MMHG | WEIGHT: 166 LBS | HEIGHT: 64 IN

## 2023-04-07 DIAGNOSIS — E78.2 MIXED HYPERLIPIDEMIA: ICD-10-CM

## 2023-04-07 DIAGNOSIS — I10 PRIMARY HYPERTENSION: ICD-10-CM

## 2023-04-07 DIAGNOSIS — R94.39 ABNORMAL STRESS TEST: ICD-10-CM

## 2023-04-07 DIAGNOSIS — R73.03 PREDIABETES: ICD-10-CM

## 2023-04-07 DIAGNOSIS — E78.00 ELEVATED LDL CHOLESTEROL LEVEL: ICD-10-CM

## 2023-04-07 DIAGNOSIS — R74.01 ELEVATED AST (SGOT): Primary | ICD-10-CM

## 2023-04-07 DIAGNOSIS — I10 PRIMARY HYPERTENSION: Primary | ICD-10-CM

## 2023-04-07 DIAGNOSIS — I25.10 CORONARY ARTERY DISEASE INVOLVING NATIVE CORONARY ARTERY OF NATIVE HEART WITHOUT ANGINA PECTORIS: ICD-10-CM

## 2023-04-07 PROCEDURE — 1123F ACP DISCUSS/DSCN MKR DOCD: CPT | Performed by: INTERNAL MEDICINE

## 2023-04-07 PROCEDURE — 3079F DIAST BP 80-89 MM HG: CPT | Performed by: INTERNAL MEDICINE

## 2023-04-07 PROCEDURE — 3077F SYST BP >= 140 MM HG: CPT | Performed by: INTERNAL MEDICINE

## 2023-04-07 PROCEDURE — 99214 OFFICE O/P EST MOD 30 MIN: CPT | Performed by: INTERNAL MEDICINE

## 2023-04-07 RX ORDER — METOPROLOL SUCCINATE 25 MG/1
25 TABLET, EXTENDED RELEASE ORAL DAILY
Qty: 90 TABLET | Refills: 3 | Status: SHIPPED | OUTPATIENT
Start: 2023-04-07

## 2023-04-07 NOTE — TELEPHONE ENCOUNTER
Fasting labs ordered. Please let pt know and print out for her to take to [employer]. Thank you.

## 2023-04-07 NOTE — TELEPHONE ENCOUNTER
Labs printed pt notified, put up front for pt to  states it will probably be Monday when she gets them.

## 2023-04-07 NOTE — PATIENT INSTRUCTIONS
Plan:  ~We will order labs- CMP and fating lipids. You can see what labs Reese Steward, DO would like to add to this    Cardiac medications reviewed including indications and pertinent side effects. Medication list updated at this visit.    Check blood pressure and heart rate at home a few times per week- keep a log with dates and times and bring to office visit   Regular exercise and following a healthy diet encouraged   Follow up with me in 1 year  Medication refilled

## 2023-04-07 NOTE — PROGRESS NOTES
mood, affect, memory, mentation, or behavior are noted    EKG 05/26/22: Sinus  Rhythm, Low voltage in precordial leads. Echocardiogram 5/27/2022  Summary   Normal left ventricle size, wall thickness, and systolic function with an   estimated ejection fraction of 55-60%. No regional wall motion abnormalities   are seen. Diastolic filling parameters suggests normal diastolic function. Mild mitral regurgitation is present. Estimated pulmonary artery systolic pressure is 33 mmHg assuming a right   atrial pressure of 3 mmHg. Stress test 6/1/2022  Conclusions    Summary  Normal LV function. There is normal isotope uptake at stress and rest. There is no evidence of  myocardial ischemia or scar on perfusion imaging. Abnormal EKG response. Overall, this would be considered an abnormal, low risk, study       CT calcium score- 62, in 2023    Lab Core- 4/2022- , LDL 81    Assessment:   Coronary artery disease - CT calcium score 62. Stable w/o angina  Abnormal stress test - normal myoview w/ abnormal EKG response. Low risk. Discussed. No further testing. Chest pain - atypical/typical features. No recent   HTN - elevated today but always normal at home   HLD - stable  Former smoker  Abnormal EKG  Mild mitral regurgitation     Plan:  CMP and fating lipids. Cardiac medications reviewed including indications and pertinent side effects. Medication list updated at this visit. Remain on ASA and lipitor 40 daily. Refills provided. Check blood pressure and heart rate at home a few times per week- keep a log with dates and times and bring to office visit   Regular exercise and following a healthy diet encouraged   Follow up with me in 1 year  Medication refilled     Scribe's attestation: This note was scribed in the presence of Dr. Marichuy Long M.D. By Christy Mcleod RN    The scribes documentation has been prepared under my direction and personally reviewed by me in its entirety.   I confirm that the note

## 2023-04-07 NOTE — TELEPHONE ENCOUNTER
Patient is needing AWV labs placed for appt Future Appointments  4/7/2023 - 4/6/2025         Date Visit Type Department Provider    4/12/2023  3:00 PM [facility], DO   Appointment Notes:    AWV     Please call patient once placed so she can  orders and go to [facility]

## 2023-04-10 LAB
ALBUMIN SERPL-MCNC: 4.1 G/DL
ALP BLD-CCNC: 85 U/L
ALT SERPL-CCNC: 31 U/L
ANION GAP SERPL CALCULATED.3IONS-SCNC: NORMAL MMOL/L
AST SERPL-CCNC: 22 U/L
BASOPHILS ABSOLUTE: 0 /ΜL
BASOPHILS RELATIVE PERCENT: 1 %
BILIRUB SERPL-MCNC: 0.5 MG/DL (ref 0.1–1.4)
BUN BLDV-MCNC: 10 MG/DL
CALCIUM SERPL-MCNC: 9 MG/DL
CHLORIDE BLD-SCNC: 104 MMOL/L
CHOLESTEROL, TOTAL: 131 MG/DL
CHOLESTEROL/HDL RATIO: NORMAL
CO2: 22 MMOL/L
CREAT SERPL-MCNC: 0.77 MG/DL
EGFR: NORMAL
EOSINOPHILS ABSOLUTE: 0.2 /ΜL
EOSINOPHILS RELATIVE PERCENT: 3 %
GLUCOSE BLD-MCNC: 105 MG/DL
HCT VFR BLD CALC: 41.6 % (ref 36–46)
HDLC SERPL-MCNC: 48 MG/DL (ref 35–70)
HEMOGLOBIN: 13.6 G/DL (ref 12–16)
LDL CHOLESTEROL CALCULATED: 65 MG/DL (ref 0–160)
LYMPHOCYTES ABSOLUTE: 1.6 /ΜL
LYMPHOCYTES RELATIVE PERCENT: 21 %
MCH RBC QN AUTO: 30.9 PG
MCHC RBC AUTO-ENTMCNC: 32.7 G/DL
MCV RBC AUTO: 95 FL
MONOCYTES ABSOLUTE: 0.6 /ΜL
MONOCYTES RELATIVE PERCENT: 8 %
NEUTROPHILS ABSOLUTE: 5.2 /ΜL
NEUTROPHILS RELATIVE PERCENT: 67 %
NONHDLC SERPL-MCNC: NORMAL MG/DL
PLATELET # BLD: 282 K/ΜL
PMV BLD AUTO: NORMAL FL
POTASSIUM SERPL-SCNC: 4.7 MMOL/L
RBC # BLD: 4.4 10^6/ΜL
SODIUM BLD-SCNC: 140 MMOL/L
TOTAL PROTEIN: 6.3
TRIGL SERPL-MCNC: 94 MG/DL
VLDLC SERPL CALC-MCNC: 18 MG/DL
WBC # BLD: 7.6 10^3/ML

## 2023-05-08 DIAGNOSIS — M54.2 NECK PAIN: ICD-10-CM

## 2023-05-08 RX ORDER — METOPROLOL SUCCINATE 25 MG/1
TABLET, EXTENDED RELEASE ORAL
Qty: 90 TABLET | Refills: 3 | Status: SHIPPED | OUTPATIENT
Start: 2023-05-08

## 2023-05-08 NOTE — TELEPHONE ENCOUNTER
4/12/2023        Future Appointments   Date Time Provider Demarco Flores   5/22/2023  1:20 PM SOCORRO WHITNEY [facility]   10/12/2023 10:45 AM DO TEETEE Carter [place] - [facility]

## 2023-05-09 RX ORDER — TIZANIDINE 4 MG/1
TABLET ORAL
Qty: 21 TABLET | Refills: 0 | Status: SHIPPED | OUTPATIENT
Start: 2023-05-09

## 2023-05-26 ENCOUNTER — TELEPHONE (OUTPATIENT)
Dept: CARDIOLOGY CLINIC | Age: 68
End: 2023-05-26

## 2023-05-26 NOTE — TELEPHONE ENCOUNTER
----- Message from Cezar Quinones MD sent at 5/26/2023  2:38 PM EDT -----  Please let Charlane Kanner know, cholesterol looks great. Continue current therapies.   Follow-up as planned

## 2023-06-15 ENCOUNTER — TELEPHONE (OUTPATIENT)
Dept: FAMILY MEDICINE CLINIC | Age: 68
End: 2023-06-15

## 2023-07-10 ENCOUNTER — TELEPHONE (OUTPATIENT)
Dept: CARDIOLOGY CLINIC | Age: 68
End: 2023-07-10

## 2023-07-10 NOTE — TELEPHONE ENCOUNTER
Chart reviewed-coronary artery disease by CT without clinical event history. Reassuring stress test recently. May hold aspirin x7 days prior to tooth extraction, restart soon as possible post procedure.

## 2023-07-10 NOTE — TELEPHONE ENCOUNTER
CARDIAC CLEARANCE REQUEST    What type of procedure are you having:  Tooth removal  Are you taking any blood thinners:  Aspirin   Type on anesthesia:  IV sedation with versed , fentanyl, propofol and decadron    When is your procedure scheduled for:  Not scheduled yet  What physician is performing your procedure:  Dr. Tam Ring  Phone Number:  [phone]  Fax number to send the letter:   [phone]

## 2023-08-19 LAB — HBA1C MFR BLD: 5.6 % (ref 4.8–5.6)

## 2023-09-19 ENCOUNTER — OFFICE VISIT (OUTPATIENT)
Dept: FAMILY MEDICINE CLINIC | Age: 68
End: 2023-09-19
Payer: COMMERCIAL

## 2023-09-19 VITALS
WEIGHT: 160.8 LBS | TEMPERATURE: 96.8 F | DIASTOLIC BLOOD PRESSURE: 70 MMHG | SYSTOLIC BLOOD PRESSURE: 134 MMHG | BODY MASS INDEX: 27.26 KG/M2 | OXYGEN SATURATION: 97 % | HEART RATE: 69 BPM | RESPIRATION RATE: 16 BRPM

## 2023-09-19 DIAGNOSIS — M89.8X1 PAIN OF RIGHT SCAPULA: ICD-10-CM

## 2023-09-19 DIAGNOSIS — M85.89 OSTEOPENIA OF MULTIPLE SITES: ICD-10-CM

## 2023-09-19 DIAGNOSIS — I10 PRIMARY HYPERTENSION: Primary | ICD-10-CM

## 2023-09-19 PROCEDURE — 1123F ACP DISCUSS/DSCN MKR DOCD: CPT | Performed by: FAMILY MEDICINE

## 2023-09-19 PROCEDURE — 3075F SYST BP GE 130 - 139MM HG: CPT | Performed by: FAMILY MEDICINE

## 2023-09-19 PROCEDURE — 3078F DIAST BP <80 MM HG: CPT | Performed by: FAMILY MEDICINE

## 2023-09-19 PROCEDURE — 99214 OFFICE O/P EST MOD 30 MIN: CPT | Performed by: FAMILY MEDICINE

## 2023-09-19 RX ORDER — LIDOCAINE AND PRILOCAINE 25; 25 MG/G; MG/G
CREAM TOPICAL
Qty: 1 EACH | Refills: 1 | Status: SHIPPED | OUTPATIENT
Start: 2023-09-19

## 2023-09-21 LAB
CALCIUM SERPL-MCNC: 9.2 MG/DL (ref 8.7–10.3)
MAGNESIUM: 2.2 MG/DL (ref 1.6–2.3)
VITAMIN D 25-HYDROXY: 40.7 NG/ML (ref 30–100)

## 2023-09-28 ENCOUNTER — OFFICE VISIT (OUTPATIENT)
Dept: ORTHOPEDIC SURGERY | Age: 68
End: 2023-09-28
Payer: COMMERCIAL

## 2023-09-28 VITALS — HEIGHT: 64 IN | WEIGHT: 160 LBS | BODY MASS INDEX: 27.31 KG/M2

## 2023-09-28 DIAGNOSIS — M25.511 RIGHT SHOULDER PAIN, UNSPECIFIED CHRONICITY: Primary | ICD-10-CM

## 2023-09-28 DIAGNOSIS — S16.1XXA STRAIN OF NECK MUSCLE, INITIAL ENCOUNTER: ICD-10-CM

## 2023-09-28 PROCEDURE — 99203 OFFICE O/P NEW LOW 30 MIN: CPT | Performed by: ORTHOPAEDIC SURGERY

## 2023-09-28 PROCEDURE — 1123F ACP DISCUSS/DSCN MKR DOCD: CPT | Performed by: ORTHOPAEDIC SURGERY

## 2023-09-28 SDOH — HEALTH STABILITY: PHYSICAL HEALTH: ON AVERAGE, HOW MANY DAYS PER WEEK DO YOU ENGAGE IN MODERATE TO STRENUOUS EXERCISE (LIKE A BRISK WALK)?: 4 DAYS

## 2023-09-28 SDOH — HEALTH STABILITY: PHYSICAL HEALTH: ON AVERAGE, HOW MANY MINUTES DO YOU ENGAGE IN EXERCISE AT THIS LEVEL?: 30 MIN

## 2023-09-28 ASSESSMENT — SOCIAL DETERMINANTS OF HEALTH (SDOH)
WITHIN THE LAST YEAR, HAVE YOU BEEN KICKED, HIT, SLAPPED, OR OTHERWISE PHYSICALLY HURT BY YOUR PARTNER OR EX-PARTNER?: NO
WITHIN THE LAST YEAR, HAVE YOU BEEN AFRAID OF YOUR PARTNER OR EX-PARTNER?: NO
WITHIN THE LAST YEAR, HAVE YOU BEEN HUMILIATED OR EMOTIONALLY ABUSED IN OTHER WAYS BY YOUR PARTNER OR EX-PARTNER?: NO

## 2023-09-28 NOTE — PROGRESS NOTES
New Patient: SPINE    9/28/2023     CHIEF COMPLAINT:    Chief Complaint   Patient presents with    Shoulder Pain     right       HISTORY OF PRESENT ILLNESS:              The patient is a 70 y.o. female presents to the office today with a new problem. Patient here with a chief complaint of right scapular pain. Patient has had pain for 1.5-2 years. No specific injury or trauma. She complains of of pain 8/10. She does have a significant history for cervical pathology. She had an injury as a  over 30 years ago injuring C3-C7. No previous surgery. She has utilized medical massage with some value. She denies any distal radicular symptoms. Past Medical History:   Diagnosis Date    Allergic rhinitis     Asthma     Chronic back pain     Coronary artery disease involving native coronary artery of native heart without angina pectoris 4/7/2023    GERD (gastroesophageal reflux disease)     Headache     Hearing loss     Hyperlipidemia     Kidney stones 2015    Osteoarthritis     Primary hypertension 5/27/2022          Pain Assessment  Location of Pain: Shoulder  Location Modifiers: Left  Severity of Pain: 8  Quality of Pain: Other (Comment)  Duration of Pain: Other (Comment)  Frequency of Pain: Other (Comment)    The pain assessment was noted & reviewed in the medical record today.      Current/Past Treatment:   Physical Therapy:   Chiropractic:     Injection:     Medications:            NSAIDS:             Muscle relaxer:              Steriods:              Neuropathic medications:              Opioids:            Other:   Surgery/Consult:    Work Status/Functionality:     Past Medical History: Medical history form was reviewed today & scanned into the media tab  Past Medical History:   Diagnosis Date    Allergic rhinitis     Asthma     Chronic back pain     Coronary artery disease involving native coronary artery of native heart without angina pectoris 4/7/2023    GERD (gastroesophageal reflux disease)

## 2023-10-03 NOTE — PROGRESS NOTES
General Surgery Consult Note      Name: Sada Le ADMIT: 10/2/2023   : 1970  PCP: Yodit Johnston MD    MRN: [id number] LOS: 1 days   AGE/SEX: 53 y.o. female  ROOM: [address] [place] [address]      Patient Care Team:  Yodit Johnston MD as PCP - General (Family Medicine)  Amos Olivia MD as Consulting Physician (Cardiology)  Brant Martinez MD as Consulting Physician (Cardiology)  Chong Golden MD as Consulting Physician (Ophthalmology)  Chief Complaint   Patient presents with    Abdominal Pain     Abd pain, history of diverticulitis, states she has had pain since .  Pt states it is so bad right now she can't take it she has cramping and diarrhea         HPI  53 y.o. female who presents to the emergency department for abdominal pain.  She reports she has a history of diverticulitis for many years now however she previously went long periods of time without a flare.  Over the last year she has had multiple flares.  She reports the most recent flare started in July where she would have periods of slight improvement and then subsequent worsening.  Over the last several days her pain has progressively gotten worse prompting her to present to the emergency department.  She denies ever having a prior colonoscopy.  She does report that more recently her stools have been smaller in caliber.  CT of the abdomen and pelvis showed diverticulitis of the sigmoid colon with an intramural abscess.  Denies any fevers or chills.  Pain is worse with urination and bowel movements.  Last bowel movement was yesterday.    Of note the patient has a history of LAD STEMI with associated cardiogenic shock for which she was admitted in 2019.  She has multiple stents and is on Plavix.  Plavix was last taken yesterday.    Past Medical History:   Diagnosis Date    Absence of left thumb     Impression: hx of MRSA, she is signficantly improved She will continue meds and followup if sxs have not  Pt transferred to phase two. VSS. resolved over the next 2 weeks.. She is released from care and will notify us of any problems    Acute otitis media     Allergic rhinitis     Arthritis     neck    Cancer     skin    Cervical disc disease with myelopathy     Contact dermatitis or eczema     Coronary artery disease     Disorder of bone and cartilage     Diverticulitis of colon     Impression: CT confirms in the sigmoid colon 10/2011    Diverticulosis of colon     Impression: No sxs 02/13/2013    Elevated cholesterol     Elevated lipoprotein(a) 4/30/2020    Elevated temperature     External otitis of left ear     Gastroesophageal reflux disease without esophagitis 6/19/2022    Hearing loss due to cerumen impaction, left     Hemangioma of liver     History of echocardiogram 10/03/2019    Severely reduced LV systolic function. EF 34% Distribution indicative of LAD territory injury. Akinesis of the apex as well as apical lateral and mid to apical septal walls. Preservation of posterior inferior lateral walls. Mild AI, mild TR. Normal RV function.     History of transesophageal echocardiography (NAFISA) 01/07/2020    EF 55% LA cavity is mild to moderately dilated. Mild MR. Interatrial septum appears redundant. Interatrial hypermobile c/w aneurysm. Large PFO is noted with right to left shunting on bubble study. PFO tunnel appears to be short.     Hordeolum externum of left lower eyelid     Impression: She was started on Bactrim DS for her infection. She was also advised to use warm compression. She will followup should sxs not improve over the next 48 hrs and resolve over the next 1 weeek.    Hyperlipidemia     Impression: Diet controled. She should see improvement with her successful wt loss. We discussed her lipid panel.    Hypertension, benign     Impression: new onset Low salt low calorie diet and regular exercise and followup in 1 mo She will monitor her pressures and notify us of her pressures over the next 1 week.    Inclusion cyst of right breast      Impression: We will follow for now. She is encouraged to have Mammography. She is presently without insurance and reluctant. She bobby consider. She will notify us of any change at all in the lesion for the only way to be certain of it's etilogy is to remove it. She understands.    Insomnia, organic     Menopausal syndrome     Overweight (BMI 25.0-29.9)     RUQ abdominal pain     Impression: Resolving, negative GB u/s, HIDA EF 77%, we will follow    Stented coronary artery 09/29/2019    Tobacco use     Impression: She is strongly encouraged to stop smoking. Cessation techniques discussed and encouraged. The consequences of not stopping discussed, ie, CAD, PVD, Stroke, Lung and other cancers.     Past Surgical History:   Procedure Laterality Date    BIVENTRICULAR ASSIST DEVICE/LEFT VENTRICULAR ASSIST DEVICE INSERTION N/A 9/29/2019    Procedure: Left Ventricular Assist Device;  Surgeon: Brant Martinez MD;  Location: [facility] CATH INVASIVE LOCATION;  Service: Cardiovascular    CARDIAC CATHETERIZATION N/A 9/29/2019    Procedure: Left Heart Cath;  Surgeon: Brant Martinez MD;  Location: [facility] CATH INVASIVE LOCATION;  Service: Cardiovascular    CARDIAC CATHETERIZATION N/A 9/29/2019    Procedure: Coronary angiography;  Surgeon: Brant Martinez MD;  Location: [facility] CATH INVASIVE LOCATION;  Service: Cardiovascular    CARDIAC CATHETERIZATION N/A 9/29/2019    Procedure: Left ventriculography;  Surgeon: Brant Martinez MD;  Location: [facility] CATH INVASIVE LOCATION;  Service: Cardiovascular    CARDIAC CATHETERIZATION N/A 9/29/2019    Procedure: Stent SERENITY coronary;  Surgeon: Brant Martinez MD;  Location: [facility] CATH INVASIVE LOCATION;  Service: Cardiovascular    CARDIAC CATHETERIZATION N/A 2/25/2020    Procedure: Right Heart Cath;  Surgeon: Brant Martinez MD;  Location: [facility] CATH INVASIVE LOCATION;  Service: Cardiology;  Laterality: N/A;    CARDIAC CATHETERIZATION N/A  2/12/2021    Procedure: Left Heart Cath;  Surgeon: Brant Martinez MD;  Location: [facility] CATH INVASIVE LOCATION;  Service: Cardiology;  Laterality: N/A;    CARDIAC CATHETERIZATION N/A 2/12/2021    Procedure: Coronary angiography;  Surgeon: Brant Martinez MD;  Location: [facility] CATH INVASIVE LOCATION;  Service: Cardiology;  Laterality: N/A;    CARDIAC CATHETERIZATION N/A 2/12/2021    Procedure: Left ventriculography;  Surgeon: Brant Martinez MD;  Location: [facility] CATH INVASIVE LOCATION;  Service: Cardiology;  Laterality: N/A;    CARDIAC CATHETERIZATION N/A 2/12/2021    Procedure: Aortic root aortogram;  Surgeon: Brant Martinez MD;  Location: [facility] CATH INVASIVE LOCATION;  Service: Cardiology;  Laterality: N/A;    CARDIAC CATHETERIZATION N/A 2/12/2021    Procedure: Percutaneous Coronary Intervention;  Surgeon: Brant Martinez MD;  Location: [facility] CATH INVASIVE LOCATION;  Service: Cardiology;  Laterality: N/A;    CARDIAC CATHETERIZATION N/A 2/12/2021    Procedure: Stent SERENITY coronary;  Surgeon: Brant Martinez MD;  Location: [facility] CATH INVASIVE LOCATION;  Service: Cardiology;  Laterality: N/A;    CARDIAC ELECTROPHYSIOLOGY PROCEDURE  9/29/2019    Procedure: Impella Insertion;  Surgeon: Brant Martinez MD;  Location: [facility] CATH INVASIVE LOCATION;  Service: Cardiovascular    CORONARY ANGIOPLASTY  02/12/2021    1x stent to Circ    CORONARY ANGIOPLASTY WITH STENT PLACEMENT  02/2021    OH RT/LT HEART CATHETERS N/A 9/29/2019    Procedure: Percutaneous Coronary Intervention;  Surgeon: Brant Martinez MD;  Location: [facility] CATH INVASIVE LOCATION;  Service: Cardiovascular    TOTAL ABDOMINAL HYSTERECTOMY WITH SALPINGO OOPHORECTOMY  1995    Endometriosis     Family History   Problem Relation Age of Onset    Alzheimer's disease Mother     Heart disease Mother         cardiovascular    Kidney disease Mother     Throat cancer Mother     Thyroid disease  Mother     Diabetes Father         mellitus    Heart attack Father     Thyroid disease Father     Heart disease Sister         cardiovascular    Diabetes Maternal Aunt         mellitus    Heart disease Maternal Aunt         cardiovascular    Breast cancer Maternal Aunt     Cancer Maternal Aunt         breast    Diabetes Maternal Uncle         mellitus    Alzheimer's disease Maternal Uncle     Diabetes Maternal Grandmother         mellitus    Pancreatic cancer Maternal Grandmother     Kidney disease Paternal Uncle        Social History     Tobacco Use    Smoking status: Former     Packs/day: 1.00     Years: 34.00     Pack years: 34.00     Types: Cigarettes     Start date:      Quit date: 2019     Years since quittin.0     Passive exposure: Never    Smokeless tobacco: Never    Tobacco comments:     States she is quitting as of 2019.   Vaping Use    Vaping Use: Never used   Substance Use Topics    Alcohol use: Yes     Comment: occassionally     Drug use: Not Currently     Comment: occasional     Medications Prior to Admission   Medication Sig Dispense Refill Last Dose    cetirizine (zyrTEC) 10 MG tablet Take 1 tablet by mouth Every Night.   10/2/2023    cholecalciferol (VITAMIN D3) 1.25 MG (84514 UT) capsule Take 1 capsule by mouth Daily.   10/2/2023    clopidogrel (PLAVIX) 75 MG tablet TAKE 1 TABLET BY MOUTH EVERY DAY 90 tablet 3 10/2/2023    [facility] Aspirin Adult Low Dose 81 MG chewable tablet CHEW AND SWALLOW 1 TABLET BY MOUTH EVERY DAY 90 tablet 3 10/2/2023    furosemide (LASIX) 40 MG tablet Take 1 tablet by mouth As Needed (swelling). 90 tablet 3 10/2/2023    lisinopril (PRINIVIL,ZESTRIL) 5 MG tablet Take 1 tablet by mouth 2 (Two) Times a Day. 180 tablet 1 10/2/2023    metFORMIN (GLUCOPHAGE) 500 MG tablet Take 1 tablet by mouth 2 (Two) Times a Day With Meals. (Patient taking differently: Take 1 tablet by mouth Daily With Breakfast.) 180 tablet 3 Past Week    metoprolol succinate XL (TOPROL-XL) 25 MG  24 hr tablet TAKE 1 TABLET BY MOUTH EVERY DAY 90 tablet 1 10/2/2023    omeprazole OTC (PriLOSEC OTC) 20 MG EC tablet TAKE 1 TABLET BY MOUTH EVERY DAY 90 tablet 3 10/2/2023    vitamin B-12 (CYANOCOBALAMIN) 500 MCG tablet Take 1 tablet by mouth Daily.   10/2/2023    nitroglycerin (NITROSTAT) 0.4 MG SL tablet Place 1 tablet under the tongue Every 5 (Five) Minutes As Needed for Chest Pain. Take no more than 3 doses in 15 minutes. 100 tablet 0 More than a month     cefTRIAXone, 2,000 mg, Intravenous, Q24H  insulin lispro, 2-7 Units, Subcutaneous, 4x Daily AC & at Bedtime  metoprolol succinate XL, 25 mg, Oral, Daily  metroNIDAZOLE, 500 mg, Intravenous, Q8H  pantoprazole, 40 mg, Oral, Q AM  senna-docusate sodium, 2 tablet, Oral, BID  sodium chloride, 10 mL, Intravenous, Q12H      lactated ringers, 75 mL/hr, Last Rate: 75 mL/hr (10/03/23 0843)        senna-docusate sodium **AND** polyethylene glycol **AND** bisacodyl **AND** bisacodyl    Calcium Replacement - Follow Nurse / BPA Driven Protocol    dextrose    dextrose    glucagon (human recombinant)    Magnesium Standard Dose Replacement - Follow Nurse / BPA Driven Protocol    Morphine    nitroglycerin    ondansetron **OR** ondansetron    Phosphorus Replacement - Follow Nurse / BPA Driven Protocol    Potassium Replacement - Follow Nurse / BPA Driven Protocol    sodium chloride    sodium chloride  Penicillins, Ciprofloxacin, and Penicillin g    Review of Systems:   As noted above in HPI    Vitals:  Temp:  [98 °F (36.7 °C)-98.8 °F (37.1 °C)] 98.8 °F (37.1 °C)  Heart Rate:  [63-81] 81  Resp:  [13-20] 15  BP: (107-143)/(60-90) 109/62     Physical Exam:   No acute distress, alert  Nonlabored respirations, slight audible wheezing  Abdomen soft, nondistended, mildly tender to palpation left lower quadrant, no guarding  Extremities warm well perfused with no gross deformities    Labs:  Results from last 7 days   Lab Units 10/03/23  0117 10/02/23  1849   WBC 10*3/mm3 10.10 11.90*    HEMOGLOBIN g/dL 12.9 13.3   HEMATOCRIT % 38.3 40.7   PLATELETS 10*3/mm3 204 225     Results from last 7 days   Lab Units 10/03/23  0117 10/02/23  1849   SODIUM mmol/L 137 138   POTASSIUM mmol/L 3.4* 3.6   CHLORIDE mmol/L 101 99   CO2 mmol/L 27.0 29.0   BUN mg/dL 12 12   CREATININE mg/dL 0.82 0.84   CALCIUM mg/dL 8.9 9.1   BILIRUBIN mg/dL  --  0.3   ALK PHOS U/L  --  93   ALT (SGPT) U/L  --  31   AST (SGOT) U/L  --  21   GLUCOSE mg/dL 180* 117*     Imaging:  CT abdomen/pelvis 10/2/2023  IMPESSION :  Acute diverticulitis of the sigmoid colon with wall thickening pericolonic stranding and free fluid. There is a 2.0 x 2.2 x 1.3 cm rim-enhancing intramural abscess on image 118 of series 3.  No bowel obstruction.  Fatty infiltration of the liver.  Bilateral adrenal nodules.    Assessment and Plan:  53 y.o. female with recurrent diverticulitis admitted for flare associated with intramural abscess.    - We discussed that ideally we would treat her nonoperatively during this hospitalization and then have her follow-up with GI for colonoscopy 6 to 8 weeks out given no prior recent colonoscopy  - We discussed the possible courses of diverticulitis including improvement with antibiotics, smoldering infection without improvement or worsening, possible abscess formation, possible perforation requiring urgent/emergent surgical intervention  - Patient last took Plavix yesterday and has an extensive cardiac history; will ask cardiology to see patient for preop risk stratification in the event surgery is needed during this admission  - Given multiple recurrent symptoms and change in bowel habits patient may ultimately need colon resection but ideally this would be done in an outpatient setting  - Continue bowel rest, IV fluids, IV antibiotics  - We will continue to follow    This note was created using Dragon Voice Recognition software.    Brandie Hale MD  10/03/23  10:08 EDT

## 2023-10-09 ENCOUNTER — HOSPITAL ENCOUNTER (OUTPATIENT)
Dept: PHYSICAL THERAPY | Age: 68
Setting detail: THERAPIES SERIES
Discharge: HOME | End: 2023-10-09
Payer: COMMERCIAL

## 2023-10-09 PROCEDURE — 97110 THERAPEUTIC EXERCISES: CPT

## 2023-10-09 PROCEDURE — 97161 PT EVAL LOW COMPLEX 20 MIN: CPT

## 2023-10-09 PROCEDURE — 97140 MANUAL THERAPY 1/> REGIONS: CPT

## 2023-10-09 NOTE — FLOWSHEET NOTE
[address] [place] [address] and [facility], [address], [address] office: [phone] fax: [phone]      Physical Therapy: TREATMENT/PROGRESS NOTE   Patient: Severa Shipper (2 y.o. female)   Treatment Date: 10/09/2023   :  1955 MRN: [id number]   Visit #: 1   Insurance Allowable Auth Needed   ? []Yes    []No    Insurance: Payor: [employer] / Plan: [employer] [place] / Product Type: *No Product type* /   Insurance ID: [id number] - (Commercial)  Secondary Insurance (if applicable):    Treatment Diagnosis: Neck pain with movement dysfunction   No diagnosis found. Medical Diagnosis:    Strain of neck muscle, initial encounter [S16. 1XXA]   Referring Physician: Emi Tristan MD  PCP: Rohini Chapa DO                             Plan of care signed (Y/N):     Date of Patient follow up with Physician:      Progress Report/POC: EVAL today  POC update due: 2023 (OR 10 visits /OR [address], whichever is less)    Latex Allergy:  [x]NO      []YES    Preferred Language for Healthcare:   [x]English       []other:    SUBJECTIVE EXAMINATION     Patient Report/Comments: see eval    OBJECTIVE EXAMINATION     Observation:     Test measurements: see eval     Test used Initial score  10/9/23 10/09/2023   Pain Summary VAS 4    Functional questionnaire Neck Disability Index 34%    Other:              RESTRICTIONS/PRECAUTIONS: OA, Osteopenia    Exercises/Interventions:     Therapeutic Ex (12560)  resistance Sets/time Reps Notes/Cues/Progressions   Chin tuck   2 10    Towel neck ext  2 10    TB ext GTB 2 15    Chin sweep  2 10    Open book   1 10                                       Manual Intervention (43302)  TIME     C-spine PA mobs grade III-IV  Lateral glides   Manual traction   PROM  STM   MFR       10                                 NMR re-education (35886) resistance Sets/time Reps CUES NEEDED                                      Therapeutic Activity (07785)

## 2023-10-18 ENCOUNTER — OFFICE VISIT (OUTPATIENT)
Dept: FAMILY MEDICINE CLINIC | Age: 68
End: 2023-10-18
Payer: COMMERCIAL

## 2023-10-18 VITALS
HEART RATE: 64 BPM | SYSTOLIC BLOOD PRESSURE: 121 MMHG | WEIGHT: 160.4 LBS | BODY MASS INDEX: 27.18 KG/M2 | TEMPERATURE: 97.1 F | DIASTOLIC BLOOD PRESSURE: 73 MMHG | RESPIRATION RATE: 16 BRPM

## 2023-10-18 DIAGNOSIS — M89.8X1 PAIN OF RIGHT SCAPULA: ICD-10-CM

## 2023-10-18 DIAGNOSIS — E78.00 ELEVATED LDL CHOLESTEROL LEVEL: ICD-10-CM

## 2023-10-18 DIAGNOSIS — M79.601 PAIN OF RIGHT ARM: ICD-10-CM

## 2023-10-18 DIAGNOSIS — Z12.31 ENCOUNTER FOR SCREENING MAMMOGRAM FOR BREAST CANCER: Primary | ICD-10-CM

## 2023-10-18 DIAGNOSIS — I10 PRIMARY HYPERTENSION: ICD-10-CM

## 2023-10-18 PROCEDURE — 99214 OFFICE O/P EST MOD 30 MIN: CPT | Performed by: FAMILY MEDICINE

## 2023-10-18 PROCEDURE — 3074F SYST BP LT 130 MM HG: CPT | Performed by: FAMILY MEDICINE

## 2023-10-18 PROCEDURE — 3078F DIAST BP <80 MM HG: CPT | Performed by: FAMILY MEDICINE

## 2023-10-18 PROCEDURE — 1123F ACP DISCUSS/DSCN MKR DOCD: CPT | Performed by: FAMILY MEDICINE

## 2023-10-18 RX ORDER — MELOXICAM 15 MG/1
15 TABLET ORAL DAILY
Qty: 10 TABLET | Refills: 0 | Status: SHIPPED | OUTPATIENT
Start: 2023-10-18

## 2023-11-01 ENCOUNTER — OFFICE VISIT (OUTPATIENT)
Dept: ORTHOPEDIC SURGERY | Age: 68
End: 2023-11-01
Payer: COMMERCIAL

## 2023-11-01 VITALS — WEIGHT: 160 LBS | BODY MASS INDEX: 27.31 KG/M2 | HEIGHT: 64 IN

## 2023-11-01 DIAGNOSIS — M47.22 OTHER SPONDYLOSIS WITH RADICULOPATHY, CERVICAL REGION: ICD-10-CM

## 2023-11-01 DIAGNOSIS — M47.812 FACET ARTHROPATHY, CERVICAL: ICD-10-CM

## 2023-11-01 DIAGNOSIS — M50.30 DDD (DEGENERATIVE DISC DISEASE), CERVICAL: ICD-10-CM

## 2023-11-01 DIAGNOSIS — M54.2 CERVICAL PAIN: Primary | ICD-10-CM

## 2023-11-01 PROCEDURE — 99214 OFFICE O/P EST MOD 30 MIN: CPT | Performed by: PHYSICIAN ASSISTANT

## 2023-11-01 PROCEDURE — 1123F ACP DISCUSS/DSCN MKR DOCD: CPT | Performed by: PHYSICIAN ASSISTANT

## 2023-11-01 NOTE — PROGRESS NOTES
is no gross instability. There are no rashes, ulcerations or lesions. Strength and tone are normal.  LEFT UPPER EXTREMITY: Inspection/examination of the left upper extremity does not show any tenderness, deformity or injury. Range of motion is unremarkable. There is no gross instability. There are no rashes, ulcerations or lesions. Strength and tone are normal.    Diagnostic Testing:  X-rays: 2 views of the cervical spine include AP and lateral were obtained today in the office and independently reviewed with the patient which shows flattening of the cervical lordosis with multilevel degenerative disc disease with near fusion of C4 and C5. There is significant disc space narrowing at C3-4 and C6-7. Impression:   DDD cervical spine  Cervical facet arthropathy  Cervical spondylosis with radiculopathy    1. Cervical pain        Plan:      We discussed the diagnosis and treatment options including observation, physical therapy, oral steroids, epidural injections or additional imaging. She wishes to proceed with MRI of the cervical spine to further evaluate her ongoing neck and right scapular pain. Patient is to continue with her outpatient physical therapy and her home exercise program which we discussed today and educational material was provided. She was also given information on how to obtain a cervical pillow for side and back sleeping. Follow Up: After the MRI the review the results and to discuss further treatment options    Old records were reviewed.     Total evaluation time to include patient education and coordination of care: 33 minutes    Loco Barrera PA-C  Board certified by the [facility] After [facility]

## 2023-11-08 ENCOUNTER — TELEPHONE (OUTPATIENT)
Dept: ORTHOPEDIC SURGERY | Age: 68
End: 2023-11-08

## 2023-11-08 DIAGNOSIS — M47.22 OTHER SPONDYLOSIS WITH RADICULOPATHY, CERVICAL REGION: ICD-10-CM

## 2023-11-08 DIAGNOSIS — M50.30 DDD (DEGENERATIVE DISC DISEASE), CERVICAL: Primary | ICD-10-CM

## 2023-11-08 DIAGNOSIS — M47.812 FACET ARTHROPATHY, CERVICAL: ICD-10-CM

## 2023-12-05 ENCOUNTER — TELEPHONE (OUTPATIENT)
Dept: FAMILY MEDICINE CLINIC | Age: 68
End: 2023-12-05

## 2023-12-05 NOTE — TELEPHONE ENCOUNTER
Pt just returned from a trip [place] and her  has tested positive for Covid. She keeps testing and is negative, but is symptomatic. It has been over 5 days since symptoms started. Please advise.

## 2023-12-06 NOTE — TELEPHONE ENCOUNTER
Pt can either be seen in the office or virtually to treat her symptoms. Can also do an E-Visit. Thank you.

## 2023-12-06 NOTE — TELEPHONE ENCOUNTER
Spoke with patient. Declined an appointment. Feels like she is improving.  She will call back if worsening

## 2023-12-28 ENCOUNTER — COMMUNITY OUTREACH (OUTPATIENT)
Dept: FAMILY MEDICINE CLINIC | Age: 68
End: 2023-12-28

## 2024-01-05 RX ORDER — ATORVASTATIN CALCIUM 40 MG/1
40 TABLET, FILM COATED ORAL DAILY
Qty: 90 TABLET | Refills: 1 | Status: SHIPPED | OUTPATIENT
Start: 2024-01-05

## 2024-01-05 RX ORDER — AMLODIPINE BESYLATE 2.5 MG/1
2.5 TABLET ORAL DAILY
Qty: 90 TABLET | Refills: 1 | Status: SHIPPED | OUTPATIENT
Start: 2024-01-05

## 2024-01-05 NOTE — TELEPHONE ENCOUNTER
Future Appointments   Date Time Provider Department Center   4/18/2024 10:00 AM Daniel Singh, DO [facility] [place] [facility]     LOV 10/18/2023

## 2024-01-21 NOTE — TELEPHONE ENCOUNTER
Letter faxed as requested. LMOM for pt to contact the office to relay clearance per RJM. Awaiting call back. No

## 2024-04-04 RX ORDER — METOPROLOL SUCCINATE 25 MG/1
TABLET, EXTENDED RELEASE ORAL
Qty: 90 TABLET | Refills: 0 | Status: SHIPPED | OUTPATIENT
Start: 2024-04-04

## 2024-04-04 NOTE — TELEPHONE ENCOUNTER
4/7/2023 [facility]  4/26/2024 [facility] upcoming appt  5/26/2022 EKG  4/10/2023 cmp

## 2024-04-09 RX ORDER — METOPROLOL SUCCINATE 25 MG/1
25 TABLET, EXTENDED RELEASE ORAL DAILY
Qty: 30 TABLET | Refills: 0 | Status: SHIPPED | OUTPATIENT
Start: 2024-04-09

## 2024-04-09 NOTE — TELEPHONE ENCOUNTER
[facility] pharmacy called stating the pt would prefer her Metoprolol succinate be filled by then instead of [facility]. [facility] tried getting the transfer script from [facility] and there is an issue with it. [facility] pharmacy isrequesting a new script for pt's Metoprolol succinate (toprol xl) sent to [facility] pharmacy [facility].

## 2024-04-09 NOTE — TELEPHONE ENCOUNTER
4/7/2023 [facility]  4/26/2024 [facility] upcoming appt  5/26/2022 EKG  4/10/2023 cmp

## 2024-04-15 SDOH — ECONOMIC STABILITY: INCOME INSECURITY: HOW HARD IS IT FOR YOU TO PAY FOR THE VERY BASICS LIKE FOOD, HOUSING, MEDICAL CARE, AND HEATING?: NOT VERY HARD

## 2024-04-15 SDOH — ECONOMIC STABILITY: FOOD INSECURITY: WITHIN THE PAST 12 MONTHS, YOU WORRIED THAT YOUR FOOD WOULD RUN OUT BEFORE YOU GOT MONEY TO BUY MORE.: NEVER TRUE

## 2024-04-15 SDOH — ECONOMIC STABILITY: FOOD INSECURITY: WITHIN THE PAST 12 MONTHS, THE FOOD YOU BOUGHT JUST DIDN'T LAST AND YOU DIDN'T HAVE MONEY TO GET MORE.: NEVER TRUE

## 2024-04-15 ASSESSMENT — PATIENT HEALTH QUESTIONNAIRE - PHQ9
SUM OF ALL RESPONSES TO PHQ9 QUESTIONS 1 & 2: 0
2. FEELING DOWN, DEPRESSED OR HOPELESS: NOT AT ALL
SUM OF ALL RESPONSES TO PHQ QUESTIONS 1-9: 0
1. LITTLE INTEREST OR PLEASURE IN DOING THINGS: NOT AT ALL
2. FEELING DOWN, DEPRESSED OR HOPELESS: NOT AT ALL
SUM OF ALL RESPONSES TO PHQ QUESTIONS 1-9: 0
SUM OF ALL RESPONSES TO PHQ QUESTIONS 1-9: 0
1. LITTLE INTEREST OR PLEASURE IN DOING THINGS: NOT AT ALL
SUM OF ALL RESPONSES TO PHQ QUESTIONS 1-9: 0
SUM OF ALL RESPONSES TO PHQ9 QUESTIONS 1 & 2: 0

## 2024-04-18 ENCOUNTER — OFFICE VISIT (OUTPATIENT)
Dept: FAMILY MEDICINE CLINIC | Age: 69
End: 2024-04-18
Payer: COMMERCIAL

## 2024-04-18 VITALS
HEART RATE: 68 BPM | TEMPERATURE: 97.2 F | DIASTOLIC BLOOD PRESSURE: 83 MMHG | BODY MASS INDEX: 28.2 KG/M2 | RESPIRATION RATE: 16 BRPM | OXYGEN SATURATION: 97 % | WEIGHT: 166.4 LBS | SYSTOLIC BLOOD PRESSURE: 133 MMHG

## 2024-04-18 DIAGNOSIS — E78.00 ELEVATED LDL CHOLESTEROL LEVEL: ICD-10-CM

## 2024-04-18 DIAGNOSIS — R73.09 ELEVATED GLUCOSE LEVEL: ICD-10-CM

## 2024-04-18 DIAGNOSIS — I10 PRIMARY HYPERTENSION: Primary | ICD-10-CM

## 2024-04-18 PROCEDURE — 3075F SYST BP GE 130 - 139MM HG: CPT | Performed by: FAMILY MEDICINE

## 2024-04-18 PROCEDURE — 1123F ACP DISCUSS/DSCN MKR DOCD: CPT | Performed by: FAMILY MEDICINE

## 2024-04-18 PROCEDURE — 3079F DIAST BP 80-89 MM HG: CPT | Performed by: FAMILY MEDICINE

## 2024-04-18 PROCEDURE — 99214 OFFICE O/P EST MOD 30 MIN: CPT | Performed by: FAMILY MEDICINE

## 2024-04-18 RX ORDER — ACYCLOVIR 400 MG/1
1 TABLET ORAL DAILY
Qty: 3 EACH | Refills: 2 | Status: SHIPPED | OUTPATIENT
Start: 2024-04-18

## 2024-04-18 RX ORDER — AMLODIPINE BESYLATE 2.5 MG/1
2.5 TABLET ORAL DAILY
Qty: 90 TABLET | Refills: 1 | Status: SHIPPED | OUTPATIENT
Start: 2024-04-18

## 2024-04-18 RX ORDER — ATORVASTATIN CALCIUM 40 MG/1
40 TABLET, FILM COATED ORAL DAILY
Qty: 90 TABLET | Refills: 1 | Status: SHIPPED | OUTPATIENT
Start: 2024-04-18

## 2024-04-18 RX ORDER — ALENDRONATE SODIUM 70 MG/1
TABLET ORAL
COMMUNITY
Start: 2024-04-02 | End: 2024-04-18

## 2024-04-18 NOTE — PROGRESS NOTES
4/18/2024    This is a 69 y.o. female   Chief Complaint   Patient presents with    Follow-up Chronic Condition     Htn.   .    HPI  Pt presents today for the following:    HTN: Taking amlodipine 2.5 mg daily and Toprol-XL 25 mg daily, today's blood pressure 133/83, home readings good, denies dizziness or HA's    Lab Results: Labs from April 11, 2024 reviewed at today's visit, within normal limits except glucose 106    Elevated LDL cholesterol: Taking Atorvastatin 40 mg daily and coenzyme Q 10 daily, lipid panel from April 11, 2024 within normal limits, denies CP  Past Medical History:   Diagnosis Date    Allergic rhinitis     Asthma     Chronic back pain     Coronary artery disease involving native coronary artery of native heart without angina pectoris 4/7/2023    GERD (gastroesophageal reflux disease)     Headache     Hearing loss     Hyperlipidemia     Kidney stones 2015    Osteoarthritis     Primary hypertension 5/27/2022       Orders Only on 09/20/2023   Component Date Value Ref Range Status    Magnesium 09/20/2023 2.2  1.6 - 2.3 mg/dL Final       Review of Systems   Cardiovascular:  Negative for chest pain.   Neurological:  Negative for dizziness and headaches.       /83 (Site: Right Upper Arm, Position: Sitting, Cuff Size: Large Adult)   Pulse 68   Temp 97.2 °F (36.2 °C) (Temporal)   Resp 16   Wt 75.5 kg (166 lb 6.4 oz)   SpO2 97%   BMI 28.20 kg/m²     Physical Exam  Vitals reviewed.   Constitutional:       Appearance: She is well-developed.   HENT:      Head: Normocephalic and atraumatic.   Eyes:      Pupils: Pupils are equal, round, and reactive to light.   Cardiovascular:      Rate and Rhythm: Normal rate and regular rhythm.      Heart sounds: Normal heart sounds. No murmur heard.  Pulmonary:      Effort: Pulmonary effort is normal.      Breath sounds: Normal breath sounds. No wheezing.   Abdominal:      General: Bowel sounds are normal.      Tenderness: There is no abdominal tenderness.

## 2024-04-26 ENCOUNTER — OFFICE VISIT (OUTPATIENT)
Dept: CARDIOLOGY CLINIC | Age: 69
End: 2024-04-26

## 2024-04-26 VITALS
WEIGHT: 165 LBS | HEART RATE: 68 BPM | HEIGHT: 64 IN | SYSTOLIC BLOOD PRESSURE: 133 MMHG | BODY MASS INDEX: 28.17 KG/M2 | OXYGEN SATURATION: 99 % | DIASTOLIC BLOOD PRESSURE: 72 MMHG

## 2024-04-26 DIAGNOSIS — Z09 CARDIOLOGY FOLLOW-UP ENCOUNTER: Primary | ICD-10-CM

## 2024-04-26 DIAGNOSIS — I25.10 CORONARY ARTERY DISEASE INVOLVING NATIVE CORONARY ARTERY OF NATIVE HEART WITHOUT ANGINA PECTORIS: ICD-10-CM

## 2024-04-26 RX ORDER — METOPROLOL SUCCINATE 25 MG/1
25 TABLET, EXTENDED RELEASE ORAL DAILY
Qty: 90 TABLET | Refills: 3 | Status: SHIPPED | OUTPATIENT
Start: 2024-04-26

## 2024-04-26 RX ORDER — AMLODIPINE BESYLATE 5 MG/1
5 TABLET ORAL DAILY
Qty: 90 TABLET | Refills: 3 | Status: SHIPPED | OUTPATIENT
Start: 2024-04-26

## 2024-04-26 NOTE — PATIENT INSTRUCTIONS
Plan:  ~Increase Norvasc, amlodipine 5 mg- you can take 2 of what you have at home, a new script will be sent for the correct dose   ~Take metoprolol in the mornings   Cardiac medications reviewed including indications and pertinent side effects. Medication list updated at this visit.   Patient verbalizes understanding of the need for treatment and education has been provided at today's visit. Additional education material will be provided in after visit summary.    Check blood pressure and heart rate at home a few times per week- keep a log with dates and times and bring to office visit    ~Goal blood pressure is 120-130/70-80's, rarely over 140/90  Regular exercise and following a healthy diet encouraged   Follow up with me in 1 year   Medication refills have been provided for one year to your preferred pharmacy

## 2024-04-26 NOTE — PROGRESS NOTES
[facility]   Cardiac follow up     Referring Provider:  Daniel Singh DO     Chief Complaint   Patient presents with    Follow-up    Coronary Artery Disease    Hypertension    Hyperlipidemia       Licha Nails   1955    History of Present Illness:   Licha Nails is a 69 y.o. female patient who is here today for follow up for an abnormal stress test, coronary artery disease- CT calcium score- 62, chest pain, hypertension, and hyperlipidemia.. Echocardiogram 5/27/2022 showed an EF of 55-60%. Stress test 6/1/2022 had an abnormal EKG response, no evidence of  myocardial ischemia or scar.    Today she states she has mau feeling well since her last visit, she states she has SOB with exertion like 2 flights of stairs that is unchanged. No SOB w/ usual activities. No chest assoc pains. Blood pressure at home is running 125-135/80's, occasional 140/80.       Past Medical History:   has a past medical history of Allergic rhinitis, Asthma, Chronic back pain, Coronary artery disease involving native coronary artery of native heart without angina pectoris, GERD (gastroesophageal reflux disease), Headache, Hearing loss, Hyperlipidemia, Kidney stones, Osteoarthritis, and Primary hypertension.    Surgical History:   has a past surgical history that includes Total shoulder arthroplasty (Right, 2008); Colonoscopy; and Plantar fascia surgery (Left, 6/9/2022).     Social History:   reports that she has never smoked. She has never used smokeless tobacco. She reports current alcohol use. She reports that she does not use drugs.     Family History:  family history is not on file.   Father ~ CAD, stent placement.   Home Medications:  Prior to Admission medications    Medication Sig Start Date End Date Taking? Authorizing Provider   amLODIPine (NORVASC) 2.5 MG tablet Take 1 tablet by mouth daily 4/18/24  Yes Daniel Singh DO   atorvastatin (LIPITOR) 40 MG tablet Take 1 tablet by mouth daily 4/18/24  Yes

## 2024-05-02 ENCOUNTER — TELEPHONE (OUTPATIENT)
Dept: FAMILY MEDICINE CLINIC | Age: 69
End: 2024-05-02

## 2024-05-02 NOTE — TELEPHONE ENCOUNTER
Teresa with [employer] on [facility] called to state the patient's dexcom sensors need a PA. She is faxing over this request now.

## 2024-05-06 NOTE — TELEPHONE ENCOUNTER
Submitted PA for Dexcom G7 Sensor   Via CMOmPrompt Key: [id number]  STATUS: PENDING.    Follow up done daily; if no decision with in three days we will refax.  If another three days goes by with no decision will call the insurance for status.

## 2024-05-07 NOTE — TELEPHONE ENCOUNTER
Patient is a diabetic so the appeal will not be successful.  Please let her know that it has been denied.  Thank you

## 2024-05-09 ENCOUNTER — TELEPHONE (OUTPATIENT)
Dept: FAMILY MEDICINE CLINIC | Age: 69
End: 2024-05-09

## 2024-05-09 NOTE — TELEPHONE ENCOUNTER
[id number] - [place], [place] - [address] - P [phone] - F [phone] [[id number]]         Continuous Blood Gluc Sensor (DEXCOM G7 SENSOR) MISC [[id number]]     Insurance does not want to pay for Dexcom, does the office do prior authorization   Please call [facility].

## 2024-05-20 ENCOUNTER — OFFICE VISIT (OUTPATIENT)
Dept: FAMILY MEDICINE CLINIC | Age: 69
End: 2024-05-20
Payer: COMMERCIAL

## 2024-05-20 VITALS
TEMPERATURE: 97.6 F | OXYGEN SATURATION: 97 % | BODY MASS INDEX: 28.13 KG/M2 | RESPIRATION RATE: 16 BRPM | DIASTOLIC BLOOD PRESSURE: 76 MMHG | HEART RATE: 73 BPM | SYSTOLIC BLOOD PRESSURE: 138 MMHG | WEIGHT: 166 LBS

## 2024-05-20 DIAGNOSIS — J22 LOWER RESP. TRACT INFECTION: Primary | ICD-10-CM

## 2024-05-20 DIAGNOSIS — U09.9 POST-COVID CHRONIC COUGH: ICD-10-CM

## 2024-05-20 DIAGNOSIS — R05.3 POST-COVID CHRONIC COUGH: ICD-10-CM

## 2024-05-20 PROCEDURE — 1123F ACP DISCUSS/DSCN MKR DOCD: CPT | Performed by: NURSE PRACTITIONER

## 2024-05-20 PROCEDURE — 99214 OFFICE O/P EST MOD 30 MIN: CPT | Performed by: NURSE PRACTITIONER

## 2024-05-20 PROCEDURE — 3078F DIAST BP <80 MM HG: CPT | Performed by: NURSE PRACTITIONER

## 2024-05-20 PROCEDURE — 3075F SYST BP GE 130 - 139MM HG: CPT | Performed by: NURSE PRACTITIONER

## 2024-05-20 RX ORDER — DEXTROMETHORPHAN HYDROBROMIDE AND PROMETHAZINE HYDROCHLORIDE 15; 6.25 MG/5ML; MG/5ML
5 SYRUP ORAL 4 TIMES DAILY PRN
Qty: 180 ML | Refills: 0 | Status: SHIPPED | OUTPATIENT
Start: 2024-05-20

## 2024-05-20 RX ORDER — AZITHROMYCIN 250 MG/1
TABLET, FILM COATED ORAL
Qty: 6 TABLET | Refills: 0 | Status: SHIPPED | OUTPATIENT
Start: 2024-05-20 | End: 2024-05-30

## 2024-05-20 RX ORDER — ALBUTEROL SULFATE 90 UG/1
2 AEROSOL, METERED RESPIRATORY (INHALATION) 4 TIMES DAILY PRN
Qty: 18 G | Refills: 0 | Status: SHIPPED | OUTPATIENT
Start: 2024-05-20

## 2024-05-20 RX ORDER — METHYLPREDNISOLONE 4 MG/1
TABLET ORAL
Qty: 1 KIT | Refills: 0 | Status: SHIPPED | OUTPATIENT
Start: 2024-05-20

## 2024-05-20 SDOH — ECONOMIC STABILITY: FOOD INSECURITY: WITHIN THE PAST 12 MONTHS, YOU WORRIED THAT YOUR FOOD WOULD RUN OUT BEFORE YOU GOT MONEY TO BUY MORE.: NEVER TRUE

## 2024-05-20 SDOH — ECONOMIC STABILITY: FOOD INSECURITY: WITHIN THE PAST 12 MONTHS, THE FOOD YOU BOUGHT JUST DIDN'T LAST AND YOU DIDN'T HAVE MONEY TO GET MORE.: NEVER TRUE

## 2024-05-20 SDOH — ECONOMIC STABILITY: INCOME INSECURITY: HOW HARD IS IT FOR YOU TO PAY FOR THE VERY BASICS LIKE FOOD, HOUSING, MEDICAL CARE, AND HEATING?: NOT HARD AT ALL

## 2024-05-20 ASSESSMENT — ANXIETY QUESTIONNAIRES
7. FEELING AFRAID AS IF SOMETHING AWFUL MIGHT HAPPEN: NOT AT ALL
3. WORRYING TOO MUCH ABOUT DIFFERENT THINGS: NOT AT ALL
4. TROUBLE RELAXING: NOT AT ALL
2. NOT BEING ABLE TO STOP OR CONTROL WORRYING: NOT AT ALL
IF YOU CHECKED OFF ANY PROBLEMS ON THIS QUESTIONNAIRE, HOW DIFFICULT HAVE THESE PROBLEMS MADE IT FOR YOU TO DO YOUR WORK, TAKE CARE OF THINGS AT HOME, OR GET ALONG WITH OTHER PEOPLE: NOT DIFFICULT AT ALL
5. BEING SO RESTLESS THAT IT IS HARD TO SIT STILL: NOT AT ALL
1. FEELING NERVOUS, ANXIOUS, OR ON EDGE: NOT AT ALL
6. BECOMING EASILY ANNOYED OR IRRITABLE: NOT AT ALL
GAD7 TOTAL SCORE: 0

## 2024-05-20 ASSESSMENT — ENCOUNTER SYMPTOMS
NAUSEA: 0
TROUBLE SWALLOWING: 0
COUGH: 1
DIARRHEA: 1
CHEST TIGHTNESS: 1
RHINORRHEA: 0
VOMITING: 0
SINUS PRESSURE: 1
EYE REDNESS: 1
EYE ITCHING: 1
SHORTNESS OF BREATH: 0
EYE PAIN: 0
WHEEZING: 1
PHOTOPHOBIA: 0
ABDOMINAL PAIN: 0

## 2024-05-20 ASSESSMENT — PATIENT HEALTH QUESTIONNAIRE - PHQ9
SUM OF ALL RESPONSES TO PHQ QUESTIONS 1-9: 0
1. LITTLE INTEREST OR PLEASURE IN DOING THINGS: NOT AT ALL
SUM OF ALL RESPONSES TO PHQ QUESTIONS 1-9: 0
2. FEELING DOWN, DEPRESSED OR HOPELESS: NOT AT ALL
SUM OF ALL RESPONSES TO PHQ9 QUESTIONS 1 & 2: 0

## 2024-05-20 NOTE — PROGRESS NOTES
5/20/2024    This is a 69 y.o. female   Chief Complaint   Patient presents with    Cough     Started Friday May 3rd Yg May 5th tested positive was seen in [place] while sick managed with tylenol did not get paxlovid, is concerned with bronchitis is grey in color when it comes up, is having bad coughing spells     .    Licha is seen today for continued covid symptoms. She first started with symptoms in May 3. She was in [place] and they do not prescribe paxlovid. Symptoms were treated with otc medication and tylenol. She felt like they were improving until last week when she started to feel worse. She notes head congestion, post nasal drip, rhinorrhea, a productive cough of purulent mucous, wheezing and headaches. Otc medications have not been effective. She has not had a reoccurrence of fever but she has been consistently taking tylenol         Patient Active Problem List   Diagnosis    Precordial pain    Primary hypertension    Mixed hyperlipidemia    Gastroesophageal reflux disease without esophagitis    Abnormal stress test    Plantar fasciitis    Coronary artery disease involving native coronary artery of native heart without angina pectoris       Current Outpatient Medications   Medication Sig Dispense Refill    amLODIPine (NORVASC) 5 MG tablet Take 1 tablet by mouth daily (Patient taking differently: Take 0.5 tablets by mouth daily) 90 tablet 3    metoprolol succinate (TOPROL XL) 25 MG extended release tablet Take 1 tablet by mouth daily 90 tablet 3    atorvastatin (LIPITOR) 40 MG tablet Take 1 tablet by mouth daily 90 tablet 1    lidocaine-prilocaine (EMLA) 2.5-2.5 % cream Apply topically as needed. 1 each 1    aspirin EC 81 MG EC tablet Take 1 tablet by mouth daily 90 tablet 1    Probiotic Product (PROBIOTIC-10 PO) Take by mouth daily      Coenzyme Q10 (COQ10 PO) Take by mouth daily      MULTIPLE VITAMIN PO Take by mouth daily      fluocinolone (DERMOTIC) 0.01 % OIL oil Place 2 drops in ear(s) 2 times daily

## 2024-05-20 NOTE — PATIENT INSTRUCTIONS
Flonase - one spray each nostril twice daily  Start antibiotics and steroids   Promethazine dm for cough- may make you tired  Albuterol as needed

## 2024-05-21 ENCOUNTER — TELEPHONE (OUTPATIENT)
Dept: FAMILY MEDICINE CLINIC | Age: 69
End: 2024-05-21

## 2024-05-21 NOTE — TELEPHONE ENCOUNTER
Pt was seen on 5/20/24 by Brinda and she was wanting to know when she can return to work?   I spoke with Nerissa yesterday and pt was notified that she could return on Wednesday 5/22/24

## 2024-05-24 ENCOUNTER — OFFICE VISIT (OUTPATIENT)
Dept: FAMILY MEDICINE CLINIC | Age: 69
End: 2024-05-24
Payer: COMMERCIAL

## 2024-05-24 VITALS
DIASTOLIC BLOOD PRESSURE: 81 MMHG | SYSTOLIC BLOOD PRESSURE: 148 MMHG | OXYGEN SATURATION: 97 % | HEART RATE: 61 BPM | HEIGHT: 64 IN | BODY MASS INDEX: 28.34 KG/M2 | WEIGHT: 166 LBS

## 2024-05-24 DIAGNOSIS — J20.9 ACUTE BRONCHITIS, UNSPECIFIED ORGANISM: Primary | ICD-10-CM

## 2024-05-24 PROCEDURE — 1123F ACP DISCUSS/DSCN MKR DOCD: CPT | Performed by: SURGERY

## 2024-05-24 PROCEDURE — 3077F SYST BP >= 140 MM HG: CPT | Performed by: SURGERY

## 2024-05-24 PROCEDURE — 3079F DIAST BP 80-89 MM HG: CPT | Performed by: SURGERY

## 2024-05-24 PROCEDURE — 99213 OFFICE O/P EST LOW 20 MIN: CPT | Performed by: SURGERY

## 2024-05-24 RX ORDER — DOXYCYCLINE HYCLATE 100 MG
100 TABLET ORAL 2 TIMES DAILY
Qty: 14 TABLET | Refills: 0 | Status: SHIPPED | OUTPATIENT
Start: 2024-05-24 | End: 2024-05-31

## 2024-05-24 RX ORDER — PREDNISONE 20 MG/1
20 TABLET ORAL DAILY
Qty: 5 TABLET | Refills: 0 | Status: SHIPPED | OUTPATIENT
Start: 2024-05-24 | End: 2024-05-29

## 2024-05-24 RX ORDER — CODEINE PHOSPHATE AND GUAIFENESIN 10; 100 MG/5ML; MG/5ML
5 SOLUTION ORAL 3 TIMES DAILY PRN
Qty: 118 ML | Refills: 0 | Status: SHIPPED | OUTPATIENT
Start: 2024-05-24 | End: 2024-06-01

## 2024-05-24 ASSESSMENT — ENCOUNTER SYMPTOMS
DIARRHEA: 0
RHINORRHEA: 1
NAUSEA: 0
COUGH: 1
ABDOMINAL PAIN: 0
CONSTIPATION: 0
SORE THROAT: 0
SHORTNESS OF BREATH: 0

## 2024-05-24 NOTE — PROGRESS NOTES
amLODIPine (NORVASC) 5 MG tablet Take 1 tablet by mouth daily (Patient taking differently: Take 0.5 tablets by mouth daily) 90 tablet 3    metoprolol succinate (TOPROL XL) 25 MG extended release tablet Take 1 tablet by mouth daily 90 tablet 3    atorvastatin (LIPITOR) 40 MG tablet Take 1 tablet by mouth daily 90 tablet 1    Continuous Blood Gluc Sensor (DEXCOM G7 SENSOR) MISC 1 each by Does not apply route daily 3 each 2    Continuous Blood Gluc  (DEXCOM G7 ) MARIA ISABEL 1 each by Does not apply route daily 3 each 2    lidocaine-prilocaine (EMLA) 2.5-2.5 % cream Apply topically as needed. 1 each 1    aspirin EC 81 MG EC tablet Take 1 tablet by mouth daily 90 tablet 1    Probiotic Product (PROBIOTIC-10 PO) Take by mouth daily      Coenzyme Q10 (COQ10 PO) Take by mouth daily      MULTIPLE VITAMIN PO Take by mouth daily      fluocinolone (DERMOTIC) 0.01 % OIL oil Place 2 drops in ear(s) 2 times daily as needed (prn) 1 each 1     No current facility-administered medications for this visit.       Allergies   Allergen Reactions    Codeine Nausea And Vomiting     intolerance       Social History     Tobacco Use    Smoking status: Never    Smokeless tobacco: Never   Substance Use Topics    Alcohol use: Yes     Comment: occ       Review of Systems   Constitutional:  Negative for chills and fever.   HENT:  Positive for congestion and rhinorrhea. Negative for ear pain and sore throat.    Respiratory:  Positive for cough (sputum). Negative for shortness of breath.    Cardiovascular:  Negative for chest pain, palpitations and leg swelling.   Gastrointestinal:  Negative for abdominal pain, constipation, diarrhea and nausea.   Genitourinary:  Negative for difficulty urinating and hematuria.   Neurological:  Negative for numbness and headaches.   Psychiatric/Behavioral:  Negative for sleep disturbance.        BP (!) 148/81 (Site: Left Upper Arm, Position: Sitting, Cuff Size: Medium Adult)   Pulse 61   Ht 1.626 m (5' 4\")

## 2024-05-24 NOTE — PATIENT INSTRUCTIONS
Recommended OTC treatment for symptoms:  acetaminophen (Tylenol) for fevers and pain relief.  antihistamines (Claritin, Zyrtec, Allegra, or Xyzal) and nasal steroid sprays (such as Flonase) to help with nasal congestion and runny nose.  guaifenesin (Mucinex) can help with thinning out mucus which can help with chest congestion or with relieving persistent sinus pressure  warm salt water gargle +/- throat sprays (Cepacol, chloraseptic) for throat pain.  dextromethorphan (Robitussin, Delsym), throat lozenges, or honey (1-2 teaspoons every hour) for cough.  warm teas, humidifiers, nasal lavages, and sleeping in an inclined position are also helpful options that can lessen symptoms.

## 2024-07-18 ENCOUNTER — OFFICE VISIT (OUTPATIENT)
Dept: FAMILY MEDICINE CLINIC | Age: 69
End: 2024-07-18
Payer: COMMERCIAL

## 2024-07-18 VITALS
HEIGHT: 65 IN | OXYGEN SATURATION: 96 % | SYSTOLIC BLOOD PRESSURE: 110 MMHG | BODY MASS INDEX: 27.79 KG/M2 | HEART RATE: 73 BPM | WEIGHT: 166.8 LBS | TEMPERATURE: 97.1 F | RESPIRATION RATE: 16 BRPM | DIASTOLIC BLOOD PRESSURE: 68 MMHG

## 2024-07-18 DIAGNOSIS — Z12.83 SKIN CANCER SCREENING: ICD-10-CM

## 2024-07-18 DIAGNOSIS — Z00.00 ANNUAL PHYSICAL EXAM: Primary | ICD-10-CM

## 2024-07-18 DIAGNOSIS — R25.1 TREMOR: ICD-10-CM

## 2024-07-18 DIAGNOSIS — R13.10 DYSPHAGIA, UNSPECIFIED TYPE: ICD-10-CM

## 2024-07-18 DIAGNOSIS — R73.09 ELEVATED GLUCOSE LEVEL: ICD-10-CM

## 2024-07-18 PROCEDURE — 99397 PER PM REEVAL EST PAT 65+ YR: CPT | Performed by: FAMILY MEDICINE

## 2024-07-18 PROCEDURE — 3078F DIAST BP <80 MM HG: CPT | Performed by: FAMILY MEDICINE

## 2024-07-18 PROCEDURE — 3074F SYST BP LT 130 MM HG: CPT | Performed by: FAMILY MEDICINE

## 2024-07-18 RX ORDER — CETIRIZINE HYDROCHLORIDE 10 MG/1
10 TABLET ORAL DAILY
COMMUNITY

## 2024-07-18 ASSESSMENT — PATIENT HEALTH QUESTIONNAIRE - PHQ9
2. FEELING DOWN, DEPRESSED OR HOPELESS: NOT AT ALL
1. LITTLE INTEREST OR PLEASURE IN DOING THINGS: NOT AT ALL
SUM OF ALL RESPONSES TO PHQ QUESTIONS 1-9: 0
SUM OF ALL RESPONSES TO PHQ9 QUESTIONS 1 & 2: 0
SUM OF ALL RESPONSES TO PHQ QUESTIONS 1-9: 0

## 2024-07-18 ASSESSMENT — ENCOUNTER SYMPTOMS
TROUBLE SWALLOWING: 1
SHORTNESS OF BREATH: 1
ABDOMINAL PAIN: 0
BACK PAIN: 0
COLOR CHANGE: 0

## 2024-07-18 NOTE — PROGRESS NOTES
Licha Nails  YOB: 1955    Date of Service:  7/18/2024    Chief Complaint:   Licha Nails is a 69 y.o. female who presents for complete physical examination.    HPI:  HPI    Hx abnormal PAP: yes - 40 years ago - f/u ok  Sexual activity: Yes   Self-breast exams: yes  Previous DEXA scan: yes- 06/14/23, abnormal: Osteopenia  Last eye exam: September 2023, normal  Exercise: walking, light weights  Seatbelt use: yes  Are You a Spiritual Person: yes  Advance Directive: yes    Wt Readings from Last 3 Encounters:   07/18/24 75.7 kg (166 lb 12.8 oz)   05/24/24 75.3 kg (166 lb)   05/20/24 75.3 kg (166 lb)     BP Readings from Last 3 Encounters:   07/18/24 110/68   05/24/24 (!) 148/81   05/20/24 138/76       Patient Active Problem List   Diagnosis    Precordial pain    Primary hypertension    Mixed hyperlipidemia    Gastroesophageal reflux disease without esophagitis    Abnormal stress test    Plantar fasciitis    Coronary artery disease involving native coronary artery of native heart without angina pectoris       Health Maintenance   Topic Date Due    Hepatitis C screen  Never done    Breast cancer screen  Never done    DTaP/Tdap/Td vaccine (1 - Tdap) 10/22/1999    Polio vaccine (2 of 3 - Adult catch-up series) 11/18/1999    Shingles vaccine (1 of 2) Never done    Pneumococcal 65+ years Vaccine (1 of 1 - PCV) Never done    Flu vaccine (1) 08/01/2024    Lipids  04/12/2025    Depression Screen  05/20/2025    Diabetes screen  08/18/2026    Colorectal Cancer Screen  04/22/2031    Hepatitis B vaccine  Completed    DEXA (modify frequency per FRAX score)  Completed    COVID-19 Vaccine  Completed    Respiratory Syncytial Virus (RSV) Pregnant or age 60 yrs+  Completed    Hepatitis A vaccine  Aged Out    Hib vaccine  Aged Out    Meningococcal (ACWY) vaccine  Aged Out    A1C test (Diabetic or Prediabetic)  Discontinued       Immunization History   Administered Date(s) Administered    COVID-19, MODERNA BLUE border,

## 2024-07-30 ENCOUNTER — TELEPHONE (OUTPATIENT)
Dept: FAMILY MEDICINE CLINIC | Age: 69
End: 2024-07-30

## 2024-07-30 NOTE — TELEPHONE ENCOUNTER
Please let patient know that her glucose is now within normal limits and she is to continue monitoring her carbohydrates and sugars.  Thank you

## 2024-09-30 RX ORDER — AMLODIPINE BESYLATE 2.5 MG/1
2.5 TABLET ORAL DAILY
Qty: 45 TABLET | Refills: 1 | Status: SHIPPED | OUTPATIENT
Start: 2024-09-30 | End: 2024-09-30

## 2024-09-30 RX ORDER — ATORVASTATIN CALCIUM 40 MG/1
40 TABLET, FILM COATED ORAL DAILY
Qty: 90 TABLET | Refills: 0 | Status: SHIPPED | OUTPATIENT
Start: 2024-09-30

## 2024-09-30 NOTE — TELEPHONE ENCOUNTER
LOV 7/18/2024    Future Appointments   Date Time Provider Department Center   11/14/2024 12:00 PM Lisa Garcia MD [facility] Neurology -

## 2024-09-30 NOTE — TELEPHONE ENCOUNTER
Amlodipine 5mg was recently filled by Dr. Law in April with 90-day supply and 3 refills.  Too early

## 2024-11-14 ENCOUNTER — OFFICE VISIT (OUTPATIENT)
Age: 69
End: 2024-11-14
Payer: COMMERCIAL

## 2024-11-14 VITALS
DIASTOLIC BLOOD PRESSURE: 68 MMHG | HEIGHT: 64 IN | WEIGHT: 155 LBS | OXYGEN SATURATION: 98 % | SYSTOLIC BLOOD PRESSURE: 138 MMHG | HEART RATE: 69 BPM | BODY MASS INDEX: 26.46 KG/M2

## 2024-11-14 DIAGNOSIS — G25.0 ESSENTIAL TREMOR: Primary | ICD-10-CM

## 2024-11-14 PROCEDURE — 3075F SYST BP GE 130 - 139MM HG: CPT | Performed by: PSYCHIATRY & NEUROLOGY

## 2024-11-14 PROCEDURE — 3078F DIAST BP <80 MM HG: CPT | Performed by: PSYCHIATRY & NEUROLOGY

## 2024-11-14 PROCEDURE — 99203 OFFICE O/P NEW LOW 30 MIN: CPT | Performed by: PSYCHIATRY & NEUROLOGY

## 2024-11-14 PROCEDURE — 1123F ACP DISCUSS/DSCN MKR DOCD: CPT | Performed by: PSYCHIATRY & NEUROLOGY

## 2024-11-14 NOTE — PROGRESS NOTES
Neurology outpatient new visit    Patient name: Licha Nails      Chief Complaint:  Head tremor.    History of present illness:  This is a 69 years old right-handed female.  The patient is here for evaluation of her tremor.  The onset was uncertain.  The patient was noticed head tremor by her PCP and she was sent here for further evaluation.  The patient denies significant impact from her tremor.  The patient also denies tremor elsewhere including her body or her arms.  The patient has no significant gait difficulty.  The patient denies history of CVA or seizure disorder.  The patient had MRI brain done last month.  It showed no significant CVA or brain atrophy.    Past medical history:    Past Medical History:   Diagnosis Date    Allergic rhinitis     Asthma     Chronic back pain     Coronary artery disease involving native coronary artery of native heart without angina pectoris 4/7/2023    GERD (gastroesophageal reflux disease)     Headache     Hearing loss     Hyperlipidemia     Kidney stones 2015    Osteoarthritis     Primary hypertension 5/27/2022       Past surgical history:    Past Surgical History:   Procedure Laterality Date    COLONOSCOPY      PLANTAR FASCIA SURGERY Left 6/9/2022    ENDOSCOPIC PLANTAR FASCIOTOMY LEFT FOOT, APPLICATION OF POSTERIOR SPLINT LEFT FOOT performed by Kenya Miller DPM at [facility]    SHOULDER ARTHROPLASTY Right 2008        Medication:    Current Outpatient Medications   Medication Sig Dispense Refill    Fexofenadine HCl (ALLEGRA PO) Take 1 tablet by mouth daily      atorvastatin (LIPITOR) 40 MG tablet TAKE 1 TABLET BY MOUTH DAILY 90 tablet 0    Melatonin 5 MG CAPS Take by mouth      albuterol sulfate HFA (VENTOLIN HFA) 108 (90 Base) MCG/ACT inhaler Inhale 2 puffs into the lungs 4 times daily as needed for Wheezing 18 g 0    amLODIPine (NORVASC) 5 MG tablet Take 1 tablet by mouth daily 90 tablet 3    metoprolol succinate (TOPROL XL) 25 MG extended release tablet Take 1

## 2024-11-14 NOTE — PATIENT INSTRUCTIONS
YOU MUST CONFIRM YOUR APPOINTMENT 1 DAY PRIOR OR IT WILL BE CANCELLED!!   Our office will call you 3 times the day prior to your appointment in an attempt to confirm.  Please return our call ASAP or confirm your appt through [facility] no later than 3 pm the day before your appointment.  If we do not hear back from you by 3 pm to confirm, your appointment will be cancelled & someone will be added into that slot from our wait list.

## 2025-01-07 ENCOUNTER — TELEPHONE (OUTPATIENT)
Dept: FAMILY MEDICINE CLINIC | Age: 70
End: 2025-01-07

## 2025-01-07 NOTE — PROGRESS NOTES
1/8/2025    This is a 69 y.o. female   Chief Complaint   Patient presents with    Shoulder Pain     Right shoulder pain for a few weeks,    .    HPI     Pt presents today for the following:    Shoulder pain:  right shoulder, sx began 2 weeks ago, denies triggering event, onset was gradual but but sx have worsened, states that 15 years ago had surgery on right shoulder (spur cleanup and removed bursa followed by PT), had full ROM after surgery, played a lot of tennis in the past, pain located in shoulder described as dull, sometimes radiates to the neck and distally down the right UE, has hx of neck issues.   Past Medical History:   Diagnosis Date    Allergic rhinitis     Asthma     Chronic back pain     Coronary artery disease involving native coronary artery of native heart without angina pectoris 4/7/2023    GERD (gastroesophageal reflux disease)     Headache     Hearing loss     Hyperlipidemia     Kidney stones 2015    Osteoarthritis     Primary hypertension 5/27/2022       Orders Only on 09/20/2023   Component Date Value Ref Range Status    Magnesium 09/20/2023 2.2  1.6 - 2.3 mg/dL Final       Review of Systems   Musculoskeletal:  Positive for arthralgias (right shoulder).       /71 (Site: Left Upper Arm, Position: Sitting, Cuff Size: Large Adult)   Pulse 74   Temp 97.5 °F (36.4 °C) (Temporal)   Resp 16   Wt 75.2 kg (165 lb 12.8 oz)   SpO2 97%   BMI 28.46 kg/m²     Physical Exam  Vitals reviewed.   Constitutional:       Appearance: Normal appearance.   Musculoskeletal:         General: Deformity (right shoulder) present. No swelling or tenderness.      Right lower leg: No edema.      Left lower leg: No edema.      Comments: Unable to raise right UE, decreased right UE ROM   Psychiatric:         Mood and Affect: Mood normal.         Behavior: Behavior normal.         Thought Content: Thought content normal.         Judgment: Judgment normal.         Plan   Diagnosis Orders   1. Acute pain of right

## 2025-01-07 NOTE — TELEPHONE ENCOUNTER
Dr Singh 1/7/2025 No Show Called Pt. They thought the appt was tomorrow reschl. for tomorrow at 10:20 a.m.

## 2025-01-08 ENCOUNTER — OFFICE VISIT (OUTPATIENT)
Dept: FAMILY MEDICINE CLINIC | Age: 70
End: 2025-01-08
Payer: COMMERCIAL

## 2025-01-08 VITALS
OXYGEN SATURATION: 97 % | SYSTOLIC BLOOD PRESSURE: 124 MMHG | WEIGHT: 165.8 LBS | DIASTOLIC BLOOD PRESSURE: 71 MMHG | BODY MASS INDEX: 28.46 KG/M2 | TEMPERATURE: 97.5 F | HEART RATE: 74 BPM | RESPIRATION RATE: 16 BRPM

## 2025-01-08 DIAGNOSIS — M25.511 ACUTE PAIN OF RIGHT SHOULDER: Primary | ICD-10-CM

## 2025-01-08 PROCEDURE — 99213 OFFICE O/P EST LOW 20 MIN: CPT | Performed by: FAMILY MEDICINE

## 2025-01-08 PROCEDURE — 1123F ACP DISCUSS/DSCN MKR DOCD: CPT | Performed by: FAMILY MEDICINE

## 2025-01-08 PROCEDURE — 3078F DIAST BP <80 MM HG: CPT | Performed by: FAMILY MEDICINE

## 2025-01-08 PROCEDURE — 3074F SYST BP LT 130 MM HG: CPT | Performed by: FAMILY MEDICINE

## 2025-01-08 RX ORDER — LIDOCAINE/PRILOCAINE 2.5 %-2.5%
CREAM (GRAM) TOPICAL
Qty: 1 EACH | Refills: 0 | Status: CANCELLED | OUTPATIENT
Start: 2025-01-08

## 2025-01-08 RX ORDER — METHYLPREDNISOLONE 4 MG/1
TABLET ORAL
Qty: 1 KIT | Refills: 0 | Status: SHIPPED | OUTPATIENT
Start: 2025-01-08 | End: 2025-01-13

## 2025-01-08 SDOH — ECONOMIC STABILITY: FOOD INSECURITY: WITHIN THE PAST 12 MONTHS, YOU WORRIED THAT YOUR FOOD WOULD RUN OUT BEFORE YOU GOT MONEY TO BUY MORE.: NEVER TRUE

## 2025-01-08 SDOH — ECONOMIC STABILITY: FOOD INSECURITY: WITHIN THE PAST 12 MONTHS, THE FOOD YOU BOUGHT JUST DIDN'T LAST AND YOU DIDN'T HAVE MONEY TO GET MORE.: NEVER TRUE

## 2025-01-08 ASSESSMENT — PATIENT HEALTH QUESTIONNAIRE - PHQ9
SUM OF ALL RESPONSES TO PHQ QUESTIONS 1-9: 0
SUM OF ALL RESPONSES TO PHQ9 QUESTIONS 1 & 2: 0
2. FEELING DOWN, DEPRESSED OR HOPELESS: NOT AT ALL
1. LITTLE INTEREST OR PLEASURE IN DOING THINGS: NOT AT ALL

## 2025-01-13 ENCOUNTER — OFFICE VISIT (OUTPATIENT)
Dept: ORTHOPEDIC SURGERY | Age: 70
End: 2025-01-13

## 2025-01-13 VITALS — WEIGHT: 165 LBS | HEIGHT: 64 IN | BODY MASS INDEX: 28.17 KG/M2

## 2025-01-13 DIAGNOSIS — M25.511 RIGHT SHOULDER PAIN, UNSPECIFIED CHRONICITY: Primary | ICD-10-CM

## 2025-01-13 DIAGNOSIS — M75.81 TENDINITIS OF RIGHT ROTATOR CUFF: ICD-10-CM

## 2025-01-13 DIAGNOSIS — M25.811 IMPINGEMENT OF RIGHT SHOULDER: ICD-10-CM

## 2025-01-13 RX ORDER — MELOXICAM 15 MG/1
15 TABLET ORAL DAILY
Qty: 30 TABLET | Refills: 3 | Status: SHIPPED | OUTPATIENT
Start: 2025-01-13

## 2025-01-13 NOTE — PROGRESS NOTES
Eval and Treat     Referral Reason:   Specialty Services Required     Requested Specialty:   Physical Therapy     Number of Visits Requested:   1       Treatment Plan: Treatment options were discussed with Licha today.  We did review her current plain films and exam findings.  She became increasingly symptomatic in roughly mid December 2024 without history of injury with pain in her right shoulder.  I do suspect we are likely dealing with a rotator cuff tendinopathy/tendinitis situation with impingement and shoulder weakness.  Her symptoms have improved about 50% since being started on a prednisone taper last week with Dr. Singh.  We did set her up for an MRI to evaluate for substantial cuff tendinopathy and partial-thickness tearing although I think she will ultimately need physical therapy.  She is about 50% improved as noted above and I encouraged her to finish her prednisone taper and I will start her on meloxicam 15 mg daily.  Hopefully we can get her MRI done expeditiously so we can see her back in the office although I think she can start therapy at this point.  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  She will contact us in the interim with questions or concerns.            This dictation was performed with a verbal recognition program (DRAGON) and it was checked for errors. It is possible that there are still dictated errors within this office note. If so, please bring any errors to my attention for an addendum. All efforts were made to ensure that this office note is accurate.

## 2025-01-15 ENCOUNTER — TELEPHONE (OUTPATIENT)
Dept: ORTHOPEDIC SURGERY | Age: 70
End: 2025-01-15

## 2025-01-15 NOTE — TELEPHONE ENCOUNTER
Spoke to patient and informed them that their MRI has been denied due to lack of 4-6 weeks of conservative care. I told patient that I put an order in for physical therapy and that they would need to complete 4-6 weeks of that and then follow up with Dr. Johnson. If they are still continuing to have symptoms we would re-submit for mri at that point.     Gave phone number for physical therapy.

## 2025-02-06 RX ORDER — METOPROLOL SUCCINATE 25 MG/1
25 TABLET, EXTENDED RELEASE ORAL DAILY
Qty: 90 TABLET | Refills: 0 | Status: SHIPPED | OUTPATIENT
Start: 2025-02-06

## 2025-02-06 NOTE — TELEPHONE ENCOUNTER
Front please call pt for yrly f/u    Last Office Visit: 4/26/2024 [place] [facility]  **Is provider OOT? No    Next Office Visit: no  **If no OV, when does pt need to be seen? in 1 year(s)  **Has patient already had 30 day supply? No    Lab orders needed? no   Encounter provider correct? Yes If not, change provider  Script changes since last refill? no    LAST LABS:   CMP:   Lab Results   Component Value Date     04/10/2023    K 4.7 04/10/2023     04/10/2023    CO2 22 04/10/2023    BUN 10 04/10/2023    CREATININE 0.77 04/10/2023    GLUCOSE 105 (H) 04/10/2023    CALCIUM 9.2 09/20/2023    BILITOT 0.5 04/10/2023    ALKPHOS 85 04/10/2023    AST 22 04/10/2023    ALT 31 04/10/2023    AGRATIO 1.9 04/10/2023    GLOB 2.2 04/10/2023

## 2025-02-12 ENCOUNTER — OFFICE VISIT (OUTPATIENT)
Dept: ORTHOPEDIC SURGERY | Age: 70
End: 2025-02-12
Payer: COMMERCIAL

## 2025-02-12 DIAGNOSIS — M75.81 TENDINITIS OF RIGHT ROTATOR CUFF: ICD-10-CM

## 2025-02-12 DIAGNOSIS — M25.511 RIGHT SHOULDER PAIN, UNSPECIFIED CHRONICITY: Primary | ICD-10-CM

## 2025-02-12 DIAGNOSIS — M25.811 IMPINGEMENT OF RIGHT SHOULDER: ICD-10-CM

## 2025-02-12 DIAGNOSIS — M54.2 CERVICAL PAIN: ICD-10-CM

## 2025-02-12 DIAGNOSIS — M50.30 DDD (DEGENERATIVE DISC DISEASE), CERVICAL: ICD-10-CM

## 2025-02-12 PROCEDURE — 1123F ACP DISCUSS/DSCN MKR DOCD: CPT | Performed by: FAMILY MEDICINE

## 2025-02-12 PROCEDURE — 99213 OFFICE O/P EST LOW 20 MIN: CPT | Performed by: FAMILY MEDICINE

## 2025-02-12 RX ORDER — MELOXICAM 15 MG/1
15 TABLET ORAL DAILY
Qty: 30 TABLET | Refills: 3 | Status: SHIPPED | OUTPATIENT
Start: 2025-02-12 | End: 2025-02-12 | Stop reason: CLARIF

## 2025-02-12 RX ORDER — METHYLPREDNISOLONE 4 MG/1
TABLET ORAL
Qty: 21 KIT | Refills: 0 | Status: SHIPPED | OUTPATIENT
Start: 2025-02-12 | End: 2025-02-12 | Stop reason: CLARIF

## 2025-02-12 RX ORDER — METHYLPREDNISOLONE 4 MG/1
TABLET ORAL
Qty: 21 KIT | Refills: 0 | Status: SHIPPED | OUTPATIENT
Start: 2025-02-12

## 2025-02-12 RX ORDER — MELOXICAM 15 MG/1
15 TABLET ORAL DAILY
Qty: 30 TABLET | Refills: 3 | Status: SHIPPED | OUTPATIENT
Start: 2025-02-12

## 2025-02-12 NOTE — PROGRESS NOTES
Chief Complaint    Shoulder Pain    FU ongoing right shoulder pain with weakness motion loss with suspected cuff tendinopathy versus capsulitis and history of chronic cervical pain with spondylolysis    History of Present Illness:  Licha Nails is a 69 y.o. 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 Daniel Singh who is being seen today in kind consultation from Dr. Singh for evaluation of newer onset of pain to her right shoulder.  She does have a history of chronic cervical pain with spondylolysis which has bothered her in the past and actually saw Dr. Calles for periscapular dyskinesis a couple years ago as well as Abel Paris in 2023 as well.  She states her most recent episode definitely feels more shoulder related and has been present since roughly mid December 2025.  There is no history of injury no activity prior to becoming symptomatic and her past orthopedic history is remarkable for previously getting what sounds to be a shoulder decompression at the [facility] about 15 years ago with improvements of her shoulder complaints until recently.  She is right-hand dominant.  She states that in December 2024 her shoulder began to bother her insidiously but there is no history of injury fall or trauma.  Her pain did get with quite severe where she had a difficult time actively elevating her shoulder was mostly superior lateral extending down the lateral arm and some extension proximally into the right side of her neck but was not associated with numbness or tingling.  She was having difficult time internally rotating actively elevating having to use her left arm to actively lift her right shoulder.  This did prompt an appointment with Dr. Singh who did place her on a prednisone taper which has helped her symptoms the least 50%.  She

## 2025-02-12 NOTE — PATIENT INSTRUCTIONS
Take Medrol first for 6 days. This is a steroid pack. Flip the package over to the foil side and the directions will tell you to start with 6 pills the first day, 5 pills the second day, etc. Please do not take any other anti-inflammatories with the medrol dose raoul as this can upset your stomach. If something else is needed, you may take extra strength tylenol.     Once you are finished with the medrol, then you may start your anti-inflammatory: MELOXICAM 1X/DAY

## 2025-02-18 ENCOUNTER — TELEPHONE (OUTPATIENT)
Dept: ORTHOPEDIC SURGERY | Age: 70
End: 2025-02-18

## 2025-02-18 NOTE — TELEPHONE ENCOUNTER
Spoke to patient and informed them that their MRI has been authorized and that they can call and schedule scan at their convenience. Also told them that they can call and schedule a f/u with Dr. Johnson once they have MRI scheduled, leaving at least 2-3 days for our office to receive their results.

## 2025-02-24 ENCOUNTER — OFFICE VISIT (OUTPATIENT)
Dept: ORTHOPEDIC SURGERY | Age: 70
End: 2025-02-24
Payer: COMMERCIAL

## 2025-02-24 DIAGNOSIS — M25.511 RIGHT SHOULDER PAIN, UNSPECIFIED CHRONICITY: Primary | ICD-10-CM

## 2025-02-24 DIAGNOSIS — M19.019 AC JOINT ARTHROPATHY: ICD-10-CM

## 2025-02-24 DIAGNOSIS — M25.811 IMPINGEMENT OF RIGHT SHOULDER: ICD-10-CM

## 2025-02-24 DIAGNOSIS — M75.81 TENDINITIS OF RIGHT ROTATOR CUFF: ICD-10-CM

## 2025-02-24 PROCEDURE — 1123F ACP DISCUSS/DSCN MKR DOCD: CPT | Performed by: FAMILY MEDICINE

## 2025-02-24 PROCEDURE — 20605 DRAIN/INJ JOINT/BURSA W/O US: CPT | Performed by: FAMILY MEDICINE

## 2025-02-24 PROCEDURE — 99213 OFFICE O/P EST LOW 20 MIN: CPT | Performed by: FAMILY MEDICINE

## 2025-02-24 PROCEDURE — 20610 DRAIN/INJ JOINT/BURSA W/O US: CPT | Performed by: FAMILY MEDICINE

## 2025-02-24 RX ORDER — LIDOCAINE HYDROCHLORIDE 10 MG/ML
4 INJECTION, SOLUTION INFILTRATION; PERINEURAL ONCE
Status: COMPLETED | OUTPATIENT
Start: 2025-02-24 | End: 2025-02-24

## 2025-02-24 RX ORDER — BUPIVACAINE HYDROCHLORIDE 2.5 MG/ML
5 INJECTION, SOLUTION INFILTRATION; PERINEURAL ONCE
Status: COMPLETED | OUTPATIENT
Start: 2025-02-24 | End: 2025-02-24

## 2025-02-24 RX ORDER — BETAMETHASONE SODIUM PHOSPHATE AND BETAMETHASONE ACETATE 3; 3 MG/ML; MG/ML
18 INJECTION, SUSPENSION INTRA-ARTICULAR; INTRALESIONAL; INTRAMUSCULAR; SOFT TISSUE ONCE
Status: COMPLETED | OUTPATIENT
Start: 2025-02-24 | End: 2025-02-24

## 2025-02-24 RX ADMIN — BUPIVACAINE HYDROCHLORIDE 12.5 MG: 2.5 INJECTION, SOLUTION INFILTRATION; PERINEURAL at 11:40

## 2025-02-24 RX ADMIN — BETAMETHASONE SODIUM PHOSPHATE AND BETAMETHASONE ACETATE 18 MG: 3; 3 INJECTION, SUSPENSION INTRA-ARTICULAR; INTRALESIONAL; INTRAMUSCULAR; SOFT TISSUE at 11:40

## 2025-02-24 RX ADMIN — LIDOCAINE HYDROCHLORIDE 4 ML: 10 INJECTION, SOLUTION INFILTRATION; PERINEURAL at 11:41

## 2025-02-24 NOTE — PROGRESS NOTES
Chief Complaint    Shoulder Pain    FU ongoing right shoulder pain with weakness motion loss with suspected cuff tendinopathy versus capsulitis and history of chronic cervical pain with spondylolysis.  Review of right shoulder imaging    History of Present Illness:  Licha Nails is a 69 y.o. 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 Daniel Singh who is being seen today in kind consultation from Dr. Singh for evaluation of newer onset of pain to her right shoulder.  She does have a history of chronic cervical pain with spondylolysis which has bothered her in the past and actually saw Dr. Calles for periscapular dyskinesis a couple years ago as well as Abel Paris in 2023 as well.  She states her most recent episode definitely feels more shoulder related and has been present since roughly mid December 2025.  There is no history of injury no activity prior to becoming symptomatic and her past orthopedic history is remarkable for previously getting what sounds to be a shoulder decompression at the [facility] about 15 years ago with improvements of her shoulder complaints until recently.  She is right-hand dominant.  She states that in December 2024 her shoulder began to bother her insidiously but there is no history of injury fall or trauma.  Her pain did get with quite severe where she had a difficult time actively elevating her shoulder was mostly superior lateral extending down the lateral arm and some extension proximally into the right side of her neck but was not associated with numbness or tingling.  She was having difficult time internally rotating actively elevating having to use her left arm to actively lift her right shoulder.  This did prompt an appointment with Dr. Singh who did place her on a prednisone taper which has
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
## 2025-03-24 ENCOUNTER — OFFICE VISIT (OUTPATIENT)
Dept: ORTHOPEDIC SURGERY | Age: 70
End: 2025-03-24
Payer: COMMERCIAL

## 2025-03-24 VITALS — WEIGHT: 165 LBS | BODY MASS INDEX: 28.17 KG/M2 | HEIGHT: 64 IN

## 2025-03-24 DIAGNOSIS — M75.81 TENDINITIS OF RIGHT ROTATOR CUFF: ICD-10-CM

## 2025-03-24 DIAGNOSIS — M25.511 RIGHT SHOULDER PAIN, UNSPECIFIED CHRONICITY: Primary | ICD-10-CM

## 2025-03-24 DIAGNOSIS — M25.811 IMPINGEMENT OF RIGHT SHOULDER: ICD-10-CM

## 2025-03-24 DIAGNOSIS — M19.019 AC JOINT ARTHROPATHY: ICD-10-CM

## 2025-03-24 PROCEDURE — 99213 OFFICE O/P EST LOW 20 MIN: CPT | Performed by: FAMILY MEDICINE

## 2025-03-24 PROCEDURE — 1123F ACP DISCUSS/DSCN MKR DOCD: CPT | Performed by: FAMILY MEDICINE

## 2025-03-24 RX ORDER — ETODOLAC 400 MG/1
400 TABLET, FILM COATED ORAL 2 TIMES DAILY
Qty: 60 TABLET | Refills: 3 | Status: SHIPPED | OUTPATIENT
Start: 2025-03-24

## 2025-03-24 NOTE — PROGRESS NOTES
Chief Complaint    Follow-up (Ck R Shoulder )    FU ongoing right shoulder pain with weakness motion loss with suspected cuff tendinopathy versus capsulitis and history of chronic cervical pain with spondylolysis.  Review of right shoulder imaging    History of Present Illness:  Licha Nails is a 69 y.o. 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 Daniel Singh who is being seen today in kind consultation from Dr. Singh for evaluation of newer onset of pain to her right shoulder.  She does have a history of chronic cervical pain with spondylolysis which has bothered her in the past and actually saw Dr. Calles for periscapular dyskinesis a couple years ago as well as Abel Paris in 2023 as well.  She states her most recent episode definitely feels more shoulder related and has been present since roughly mid December 2025.  There is no history of injury no activity prior to becoming symptomatic and her past orthopedic history is remarkable for previously getting what sounds to be a shoulder decompression at the [facility] about 15 years ago with improvements of her shoulder complaints until recently.  She is right-hand dominant.  She states that in December 2024 her shoulder began to bother her insidiously but there is no history of injury fall or trauma.  Her pain did get with quite severe where she had a difficult time actively elevating her shoulder was mostly superior lateral extending down the lateral arm and some extension proximally into the right side of her neck but was not associated with numbness or tingling.  She was having difficult time internally rotating actively elevating having to use her left arm to actively lift her right shoulder.  This did prompt an appointment with Dr. Singh who did place her on a prednisone taper

## 2025-04-08 ENCOUNTER — TELEPHONE (OUTPATIENT)
Dept: FAMILY MEDICINE CLINIC | Age: 70
End: 2025-04-08

## 2025-04-08 RX ORDER — ATORVASTATIN CALCIUM 40 MG/1
40 TABLET, FILM COATED ORAL DAILY
Qty: 90 TABLET | Refills: 1 | Status: SHIPPED | OUTPATIENT
Start: 2025-04-08

## 2025-04-08 NOTE — TELEPHONE ENCOUNTER
Patient needs a refill on Atorvastatin. They need a 90 day supply.     Mail order or local pharmacy: local    Pharmacy: [facility]    Last OV: 1/8/2025    Future Appointments   Date Time Provider Department Center   5/5/2025 10:45 AM David Johnson MD [facility]

## 2025-05-05 ENCOUNTER — OFFICE VISIT (OUTPATIENT)
Dept: ORTHOPEDIC SURGERY | Age: 70
End: 2025-05-05
Payer: COMMERCIAL

## 2025-05-05 VITALS — WEIGHT: 165 LBS | HEIGHT: 64 IN | BODY MASS INDEX: 28.17 KG/M2

## 2025-05-05 DIAGNOSIS — M25.511 RIGHT SHOULDER PAIN, UNSPECIFIED CHRONICITY: Primary | ICD-10-CM

## 2025-05-05 DIAGNOSIS — M75.81 TENDINITIS OF RIGHT ROTATOR CUFF: ICD-10-CM

## 2025-05-05 DIAGNOSIS — M19.019 AC JOINT ARTHROPATHY: ICD-10-CM

## 2025-05-05 DIAGNOSIS — M25.811 IMPINGEMENT OF RIGHT SHOULDER: ICD-10-CM

## 2025-05-05 PROCEDURE — 1123F ACP DISCUSS/DSCN MKR DOCD: CPT | Performed by: FAMILY MEDICINE

## 2025-05-05 PROCEDURE — 99213 OFFICE O/P EST LOW 20 MIN: CPT | Performed by: FAMILY MEDICINE

## 2025-05-05 RX ORDER — AMLODIPINE BESYLATE 5 MG/1
5 TABLET ORAL DAILY
Qty: 90 TABLET | Refills: 3 | Status: SHIPPED | OUTPATIENT
Start: 2025-05-05

## 2025-05-05 RX ORDER — METOPROLOL SUCCINATE 25 MG/1
25 TABLET, EXTENDED RELEASE ORAL DAILY
Qty: 90 TABLET | Refills: 0 | Status: SHIPPED | OUTPATIENT
Start: 2025-05-05

## 2025-05-05 NOTE — PROGRESS NOTES
Chief Complaint    Follow-up (Rt shoulder/6 weeks)    FU ongoing right shoulder pain with weakness motion loss with suspected cuff tendinopathy versus capsulitis and history of chronic cervical pain with spondylolysis.  Review of right shoulder imaging    History of Present Illness:  Licha Nails is a 70 y.o. 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 Daniel Singh who is being seen today in kind consultation from Dr. Singh for evaluation of newer onset of pain to her right shoulder.  She does have a history of chronic cervical pain with spondylolysis which has bothered her in the past and actually saw Dr. Calles for periscapular dyskinesis a couple years ago as well as Abel Paris in 2023 as well.  She states her most recent episode definitely feels more shoulder related and has been present since roughly mid December 2025.  There is no history of injury no activity prior to becoming symptomatic and her past orthopedic history is remarkable for previously getting what sounds to be a shoulder decompression at the [facility] about 15 years ago with improvements of her shoulder complaints until recently.  She is right-hand dominant.  She states that in December 2024 her shoulder began to bother her insidiously but there is no history of injury fall or trauma.  Her pain did get with quite severe where she had a difficult time actively elevating her shoulder was mostly superior lateral extending down the lateral arm and some extension proximally into the right side of her neck but was not associated with numbness or tingling.  She was having difficult time internally rotating actively elevating having to use her left arm to actively lift her right shoulder.  This did prompt an appointment with Dr. Singh who did place her on a prednisone

## 2025-05-05 NOTE — TELEPHONE ENCOUNTER
Last Office Visit: 4/26/2024 Provider:   **Is provider OOT? No    Next Office Visit: no Follow up with me in 1 year   LAST LABS:   Bmp 10/24/24 CE    Front please call pt and elly morin f/u

## 2025-05-06 ENCOUNTER — OFFICE VISIT (OUTPATIENT)
Dept: ORTHOPEDIC SURGERY | Age: 70
End: 2025-05-06
Payer: COMMERCIAL

## 2025-05-06 VITALS — BODY MASS INDEX: 28.17 KG/M2 | HEIGHT: 64 IN | WEIGHT: 165 LBS

## 2025-05-06 DIAGNOSIS — M75.51 SUBACROMIAL BURSITIS OF RIGHT SHOULDER JOINT: ICD-10-CM

## 2025-05-06 DIAGNOSIS — M75.21 RIGHT BICIPITAL TENOSYNOVITIS: Primary | ICD-10-CM

## 2025-05-06 DIAGNOSIS — M19.019 OSTEOARTHRITIS OF ACROMIOCLAVICULAR JOINT: ICD-10-CM

## 2025-05-06 DIAGNOSIS — M25.511 TRIGGER POINT OF RIGHT SHOULDER REGION: ICD-10-CM

## 2025-05-06 PROCEDURE — 99204 OFFICE O/P NEW MOD 45 MIN: CPT | Performed by: ORTHOPAEDIC SURGERY

## 2025-05-06 PROCEDURE — L3670 SO ACRO/CLAV CAN WEB PRE OTS: HCPCS | Performed by: ORTHOPAEDIC SURGERY

## 2025-05-06 PROCEDURE — 1123F ACP DISCUSS/DSCN MKR DOCD: CPT | Performed by: ORTHOPAEDIC SURGERY

## 2025-05-06 RX ORDER — MELOXICAM 15 MG/1
15 TABLET ORAL DAILY PRN
Qty: 30 TABLET | Refills: 0 | Status: SHIPPED | OUTPATIENT
Start: 2025-05-06

## 2025-05-06 NOTE — PROGRESS NOTES
ORTHOPAEDIC SURGERY H&P / CONSULTATION NOTE    Chief complaint:   Chief Complaint   Patient presents with    Shoulder Pain     NP- RT SHOULDER      History of present illness: The patient is a 70 y.o. female right hand dominant with subjective symptoms of right shoulder pain. The chief complaint is located at anterior superior lateral aspect of the right shoulder as well as periscapular based. Duration of symptoms has been for November/December 2024. The severity of symptoms is rated at 3/10 pain on intake form.  Patient self-reports a previous injury falling in November 24 however did not start that pain till December 2024.  She has been seen by Dr. Johnson and was referred for evaluation and treatment services given continued pain despite having attempted conservative nonoperative care.  Patient has attempted Mobic and has utilized Medrol Dosepak.  She has done formal physical therapy outside the hospital system.  She denies injections.  She had received a MRI and is here to discuss further treatment options.  She self-reports a previous remote shoulder decompression at [facility] in 2010 which she did well with.  She denies it waking her up at night but she does have pain with overhead reaching and cannot sleep directly on that shoulder.  Reaching out in front of her also causes discomfort pain.  Denies gross instability.  She has had 2 corticosteroid injections administered at the same clinic office visit in February 2025 into the acromioclavicular joint and also the subacromial space which she states roughly 2 to 3 weeks worth of overall 80 to 90% symptom relief    The patient has tried the below listed items prior to today's consultation for above listed chief complaint.     +   Over-the-counter anti-inflammatories/prescription medication anti-inflammatory.     +   Physical therapy / guided home exercise program weeks     +   Previous corticosteroid injections    Past medical history:    Past Medical

## 2025-05-15 ENCOUNTER — PREP FOR PROCEDURE (OUTPATIENT)
Dept: ORTHOPEDIC SURGERY | Age: 70
End: 2025-05-15

## 2025-05-15 DIAGNOSIS — M75.21 RIGHT BICIPITAL TENOSYNOVITIS: ICD-10-CM

## 2025-05-15 DIAGNOSIS — M75.51 SUBACROMIAL BURSITIS OF RIGHT SHOULDER JOINT: ICD-10-CM

## 2025-05-15 DIAGNOSIS — M19.019 OSTEOARTHRITIS OF ACROMIOCLAVICULAR JOINT: ICD-10-CM

## 2025-05-16 RX ORDER — ACETAMINOPHEN 325 MG/1
1000 TABLET ORAL ONCE
Status: CANCELLED | OUTPATIENT
Start: 2025-05-16 | End: 2025-05-16

## 2025-05-16 RX ORDER — MELOXICAM 7.5 MG/1
7.5 TABLET ORAL ONCE
Status: CANCELLED | OUTPATIENT
Start: 2025-05-16 | End: 2025-05-16

## 2025-05-16 RX ORDER — TRANEXAMIC ACID 100 MG/ML
1000 INJECTION, SOLUTION INTRAVENOUS ONCE
Status: CANCELLED | OUTPATIENT
Start: 2025-05-16 | End: 2025-05-16

## 2025-06-05 ENCOUNTER — TELEPHONE (OUTPATIENT)
Dept: ORTHOPEDIC SURGERY | Age: 70
End: 2025-06-05

## 2025-06-12 ENCOUNTER — TELEPHONE (OUTPATIENT)
Dept: CARDIOLOGY CLINIC | Age: 70
End: 2025-06-12

## 2025-06-12 ENCOUNTER — TELEPHONE (OUTPATIENT)
Dept: FAMILY MEDICINE CLINIC | Age: 70
End: 2025-06-12

## 2025-06-12 DIAGNOSIS — R73.09 ELEVATED GLUCOSE LEVEL: ICD-10-CM

## 2025-06-12 DIAGNOSIS — E78.00 ELEVATED LDL CHOLESTEROL LEVEL: ICD-10-CM

## 2025-06-12 DIAGNOSIS — I10 PRIMARY HYPERTENSION: Primary | ICD-10-CM

## 2025-06-12 NOTE — TELEPHONE ENCOUNTER
Patient came in asking for the preop she has scheduled for 6/17/2025 asking if her lab work for preop can be added and able to be picked up. Also wants an A1C testing added. Please call patient when she is able to  the labs.   Future Appointments   Date Time Provider Department Center   6/17/2025  3:40 PM Daniel Singh DO [facility]   7/15/2025 11:30 AM Robin Soto MD [facility]

## 2025-06-12 NOTE — TELEPHONE ENCOUNTER
CARDIAC CLEARANCE REQUEST    What type of procedure are you having:  VIDEO ARTHROSCOPY RIGHT SHOULDER, SUBACROMIAL DECOMPRESSION, DISTAL CLAVICLE RESECTION AND OPEN SUBPECTORAL BICEPS TENODESIS NOTE: INTERSCALENE SINGLE SHOT BLOCK     Are you taking any blood thinners:  no    Type on anesthesia:  General    When is your procedure scheduled for:  07/02/25    What physician is performing your procedure:  Dr. Soto    Phone Number:  [phone]    Fax number to send the letter:   [phone]

## 2025-06-12 NOTE — TELEPHONE ENCOUNTER
Last Office Visit: 4/26/24 Provider: MELISSA  **Is provider OOT? No    Next Office Visit: no  LAST LABS:   BMP:  Lab Results   Component Value Date/Time     04/10/2023 09:54 AM    K 4.7 04/10/2023 09:54 AM     04/10/2023 09:54 AM    CO2 22 04/10/2023 09:54 AM    BUN 10 04/10/2023 09:54 AM    CREATININE 0.77 04/10/2023 09:54 AM    GLUCOSE 105 04/10/2023 09:54 AM    CALCIUM 9.2 09/20/2023 01:29 PM         Bmp 10/24/24 CE    Called pt to [facility] ov. She prefers [place]. Next avail is 10/2025, but she is oot then so [facility] for 11/2025

## 2025-06-12 NOTE — TELEPHONE ENCOUNTER
Medication Refill    Medication needing refilled:  Metoprolol   Amlodipine    Dosage of the medication:  25mg  5mg    How are you taking this medication (QD, BID, TID, QID, PRN):  QD  QD    30 or 90 day supply called in:  90    When will you run out of your medication:  1 week    Which Pharmacy are we sending the medication to?:    [id number] - [place], [place] - [address] - P [phone] - F [phone] [[id number]]     Last o/v:  04/26/24

## 2025-06-13 RX ORDER — METOPROLOL SUCCINATE 25 MG/1
25 TABLET, EXTENDED RELEASE ORAL DAILY
Qty: 90 TABLET | Refills: 1 | Status: SHIPPED | OUTPATIENT
Start: 2025-06-13

## 2025-06-13 RX ORDER — AMLODIPINE BESYLATE 5 MG/1
5 TABLET ORAL DAILY
Qty: 90 TABLET | Refills: 1 | Status: SHIPPED | OUTPATIENT
Start: 2025-06-13

## 2025-06-17 ENCOUNTER — RESULTS FOLLOW-UP (OUTPATIENT)
Dept: FAMILY MEDICINE CLINIC | Age: 70
End: 2025-06-17

## 2025-06-17 ENCOUNTER — OFFICE VISIT (OUTPATIENT)
Dept: FAMILY MEDICINE CLINIC | Age: 70
End: 2025-06-17
Payer: COMMERCIAL

## 2025-06-17 VITALS
DIASTOLIC BLOOD PRESSURE: 64 MMHG | HEART RATE: 77 BPM | BODY MASS INDEX: 29.33 KG/M2 | TEMPERATURE: 97.1 F | WEIGHT: 171.8 LBS | RESPIRATION RATE: 16 BRPM | OXYGEN SATURATION: 97 % | HEIGHT: 64 IN | SYSTOLIC BLOOD PRESSURE: 130 MMHG

## 2025-06-17 DIAGNOSIS — R71.8 ELEVATED MCV: ICD-10-CM

## 2025-06-17 DIAGNOSIS — R09.89 BRUIT OF RIGHT CAROTID ARTERY: ICD-10-CM

## 2025-06-17 DIAGNOSIS — Z01.818 PREOP EXAMINATION: Primary | ICD-10-CM

## 2025-06-17 LAB
BASOPHILS ABSOLUTE: 0.1 X10E3/UL (ref 0–0.2)
BASOPHILS RELATIVE PERCENT: 1 %
CHOLESTEROL, TOTAL: 143 MG/DL (ref 100–199)
EOSINOPHILS ABSOLUTE: 0.4 X10E3/UL (ref 0–0.4)
EOSINOPHILS RELATIVE PERCENT: 5 %
HBA1C MFR BLD: 5.8 % (ref 4.8–5.6)
HCT VFR BLD CALC: 43.2 % (ref 34–46.6)
HDLC SERPL-MCNC: 40 MG/DL
HEMOGLOBIN: 13.7 G/DL (ref 11.1–15.9)
IMMATURE GRANS (ABS): 0 X10E3/UL (ref 0–0.1)
IMMATURE GRANULOCYTES %: 0 %
LDL CHOLESTEROL: 79 MG/DL (ref 0–99)
LYMPHOCYTES ABSOLUTE: 2.2 X10E3/UL (ref 0.7–3.1)
LYMPHOCYTES RELATIVE PERCENT: 28 %
MCH RBC QN AUTO: 31.1 PG (ref 26.6–33)
MCHC RBC AUTO-ENTMCNC: 31.7 G/DL (ref 31.5–35.7)
MCV RBC AUTO: 98 FL (ref 79–97)
MONOCYTES ABSOLUTE: 0.7 X10E3/UL (ref 0.1–0.9)
MONOCYTES RELATIVE PERCENT: 9 %
NEUTROPHILS ABSOLUTE: 4.5 X10E3/UL (ref 1.4–7)
NEUTROPHILS RELATIVE PERCENT: 57 %
PDW BLD-RTO: 13.2 % (ref 11.7–15.4)
PLATELET # BLD: 283 X10E3/UL (ref 150–450)
RBC # BLD: 4.41 X10E6/UL (ref 3.77–5.28)
TRIGL SERPL-MCNC: 136 MG/DL (ref 0–149)
TSH SERPL DL<=0.05 MIU/L-ACNC: 1.55 UIU/ML (ref 0.45–4.5)
VLDLC SERPL CALC-MCNC: 24 MG/DL (ref 5–40)
WBC # BLD: 7.9 X10E3/UL (ref 3.4–10.8)

## 2025-06-17 PROCEDURE — 3075F SYST BP GE 130 - 139MM HG: CPT | Performed by: FAMILY MEDICINE

## 2025-06-17 PROCEDURE — 1123F ACP DISCUSS/DSCN MKR DOCD: CPT | Performed by: FAMILY MEDICINE

## 2025-06-17 PROCEDURE — 3078F DIAST BP <80 MM HG: CPT | Performed by: FAMILY MEDICINE

## 2025-06-17 PROCEDURE — 99214 OFFICE O/P EST MOD 30 MIN: CPT | Performed by: FAMILY MEDICINE

## 2025-06-17 PROCEDURE — 93000 ELECTROCARDIOGRAM COMPLETE: CPT | Performed by: FAMILY MEDICINE

## 2025-06-17 ASSESSMENT — PATIENT HEALTH QUESTIONNAIRE - PHQ9
SUM OF ALL RESPONSES TO PHQ QUESTIONS 1-9: 0
SUM OF ALL RESPONSES TO PHQ QUESTIONS 1-9: 0
1. LITTLE INTEREST OR PLEASURE IN DOING THINGS: NOT AT ALL
SUM OF ALL RESPONSES TO PHQ QUESTIONS 1-9: 0
SUM OF ALL RESPONSES TO PHQ QUESTIONS 1-9: 0
2. FEELING DOWN, DEPRESSED OR HOPELESS: NOT AT ALL

## 2025-06-17 NOTE — PROGRESS NOTES
round, and reactive to light.   Neck: Trachea normal and normal range of motion. Neck supple. No JVD present. Right carotid bruit is present. No mass and no thyromegaly present.   Cardiovascular: Normal rate, regular rhythm, normal heart sounds and intact distal pulses.  Exam reveals no gallop and no friction rub.  No murmur heard.  Pulmonary/Chest: Effort normal and breath sounds normal. No respiratory distress. She has no wheezes. She has no rales.   Abdominal: Soft. Normal aorta and bowel sounds are normal. She exhibits no distension and no mass. There is no hepatosplenomegaly. No tenderness.   Musculoskeletal: She exhibits no edema and no tenderness.   Neurological: She is alert and oriented to person, place, and time. She has normal strength. No cranial nerve deficit or sensory deficit. Coordination and gait normal.   Skin: Skin is warm and dry. No rash noted. No erythema.   Psychiatric: She has a normal mood and affect. Her behavior is normal.     EKG Interpretation:  normal EKG, normal sinus rhythm.    Lab Review   Lab Results   Component Value Date/Time     04/10/2023 09:54 AM    K 4.7 04/10/2023 09:54 AM     04/10/2023 09:54 AM    CO2 22 04/10/2023 09:54 AM    BUN 10 04/10/2023 09:54 AM    CREATININE 0.77 04/10/2023 09:54 AM    GLUCOSE 105 04/10/2023 09:54 AM    CALCIUM 9.2 09/20/2023 01:29 PM           Assessment:       70 y.o. patient with planned surgery as above.    Known risk factors for perioperative complications: Coronary artery disease, Hypertension  Current medications which may produce withdrawal symptoms if withheld perioperatively: Toprol XL      Plan:     1. Preoperative workup as follows: ECG, Lab reviews  2. Change in medication regimen before surgery: Discontinue ASA 7 days before surgery  3. Prophylaxis for cardiac events with perioperative beta-blockers: Currently taking  metoprolol  ACC/AHA indications for pre-operative beta-blocker use:    Vascular surgery with history of

## 2025-06-19 LAB
FOLATE: 18 NG/ML
Lab: NORMAL
VITAMIN B-12: 999 PG/ML (ref 232–1245)

## 2025-06-20 ENCOUNTER — RESULTS FOLLOW-UP (OUTPATIENT)
Dept: FAMILY MEDICINE CLINIC | Age: 70
End: 2025-06-20

## 2025-06-23 ENCOUNTER — TELEPHONE (OUTPATIENT)
Dept: FAMILY MEDICINE CLINIC | Age: 70
End: 2025-06-23

## 2025-06-23 NOTE — PROGRESS NOTES
Licha A White    Age 70 y.o.    female    1955    MRN [id number]    7/2/2025  Arrival Time_____________  OR Time____________136 Min     Procedure(s):  VIDEO ARTHROSCOPY RIGHT SHOULDER, SUBACROMIAL DECOMPRESSION, DISTAL CLAVICLE RESECTION AND OPEN SUBPECTORAL BICEPS TENODESIS NOTE: INTERSCALENE SINGLE SHOT BLOCK                      General    Surgeon(s):  Robin Soto, MD       Phone [phone] (home)     [facility]  Date  Info Source  Home  Cell         Work  _____________________________________________________________________  _____________________________________________________________________  _____________________________________________________________________  _____________________________________________________________________  _____________________________________________________________________    PCP _____________________________ Phone_________________     H&P  ________________  Bringing      Chart              Epic      DOS      Called________  EKG ________________   Bringing      Chart              Epic      DOS      Called________  LABS________________   Bringing     Chart              Epic      DOS      Called________  Cardiac Clearance ______ Bringing      Chart              Epic      DOS      Called________  Pulmonary Clearance____ Bringing      Chart              Epic      DOS      Called________    Cardiologist________________________ Phone___________________________  Pulmonologist_______________________Phone___________________________      ? Blood Refusal / Waiver on Chart            [employer]________________  ? Pre-op Instructions Given /Understood          _________________________________  ? Directions to Surgery Center                          _________________________________  ? Transportation Home_______________      __________________________________  ? Crutches/Walker__________________        __________________________________    Orders: Hard copy/ EPIC

## 2025-06-27 ENCOUNTER — ANESTHESIA EVENT (OUTPATIENT)
Dept: OPERATING ROOM | Age: 70
End: 2025-06-27
Payer: COMMERCIAL

## 2025-06-30 ENCOUNTER — HOSPITAL ENCOUNTER (OUTPATIENT)
Dept: VASCULAR LAB | Age: 70
Discharge: HOME | End: 2025-07-02
Payer: COMMERCIAL

## 2025-06-30 DIAGNOSIS — R09.89 BRUIT OF RIGHT CAROTID ARTERY: ICD-10-CM

## 2025-06-30 PROCEDURE — 93880 EXTRACRANIAL BILAT STUDY: CPT

## 2025-06-30 NOTE — PROGRESS NOTES
DATE AND TIME OF SURGERY: 07/02/2025 @ 0730              ARRIVAL TIME:0600    [facility] is located at [address] [place] [address], [place], [place]  It is the tan building to the right of the main hospital and the entrance is marked in blue letters [facility] [place]     These are general instructions. Please follow any additional instructions provided to you by your surgeon's office.     Bring Picture ID and insurance card.  Please wear simple, loose fitting clothing to the surgery center.   Do not bring valuables (money, credit cards, checkbooks, etc.)   Do not wear any makeup.   No nail polish on fingers and/or toes. No artificial nails.   No metal hair clips and/or paris pins. Elastic hair ties (without metal) and cloth scrunchies are OK.  Do not wear any jewelry or piercings on day of surgery.  All body piercings must be removed.  If you have dentures, they will be removed before going to the OR; we will provide a container, if needed.  If you wear contact lenses or glasses, they will be removed; please bring a case.  If you wear hearing aids, they will be removed; we will provide a container, if needed.   If you use oxygen and have a portable tank, please bring it with you. If you use a CPAP machine, please bring it with you.   Nothing to eat or drink after midnight the day before surgery. This includes ice chips, sips of water, hard candy, and chewing gum.   Do not smoke, vape or drink any alcoholic beverages 24 hours prior to surgery.  This includes non-alcoholic beer. Refrain from the usage of any recreational drugs.  Medication instructions:  Take the following medication(s) morning of surgery with a sip of water: Amlodipine  Aspirin, Ibuprofen, Advil, Naproxen, Vitamin E and other Anti-inflammatory products and supplements should be stopped for 5 -7days before surgery or as directed by your physician.  Shower the evening before or morning of surgery with antibacterial soap.  You

## 2025-07-01 ENCOUNTER — TELEPHONE (OUTPATIENT)
Dept: ORTHOPEDIC SURGERY | Age: 70
End: 2025-07-01

## 2025-07-01 LAB
VAS LEFT ARM BP DIA: 72 MMHG
VAS LEFT ARM BP: 144 MMHG
VAS LEFT CCA DIST EDV: 15.9 CM/S
VAS LEFT CCA DIST PSV: 109 CM/S
VAS LEFT CCA MID EDV: 14 CM/S
VAS LEFT CCA MID PSV: 123 CM/S
VAS LEFT CCA PROX EDV: 4.2 CM/S
VAS LEFT CCA PROX PSV: 157 CM/S
VAS LEFT ECA EDV: 7.41 CM/S
VAS LEFT ECA PSV: 105 CM/S
VAS LEFT ICA DIST EDV: 20.1 CM/S
VAS LEFT ICA DIST PSV: 112 CM/S
VAS LEFT ICA MID EDV: 24.4 CM/S
VAS LEFT ICA MID PSV: 104 CM/S
VAS LEFT ICA PROX EDV: 19.1 CM/S
VAS LEFT ICA PROX PSV: 94.3 CM/S
VAS LEFT ICA/CCA PSV: 0.91
VAS LEFT SUBCLAVIAN PROX EDV: 0 CM/S
VAS LEFT SUBCLAVIAN PROX PSV: 160 CM/S
VAS LEFT VERTEBRAL EDV: 12.7 CM/S
VAS LEFT VERTEBRAL PSV: 74.1 CM/S
VAS RIGHT ARM BP DIA: 72 MMHG
VAS RIGHT ARM BP: 144 MMHG
VAS RIGHT CCA DIST EDV: 22.3 CM/S
VAS RIGHT CCA DIST PSV: 123 CM/S
VAS RIGHT CCA MID EDV: 14.6 CM/S
VAS RIGHT CCA MID PSV: 111 CM/S
VAS RIGHT CCA PROX EDV: 12.9 CM/S
VAS RIGHT CCA PROX PSV: 114 CM/S
VAS RIGHT ECA EDV: 12 CM/S
VAS RIGHT ECA PSV: 135 CM/S
VAS RIGHT ICA DIST EDV: 20.6 CM/S
VAS RIGHT ICA DIST PSV: 84.9 CM/S
VAS RIGHT ICA MID EDV: 20.8 CM/S
VAS RIGHT ICA MID PSV: 82.7 CM/S
VAS RIGHT ICA PROX EDV: 14.2 CM/S
VAS RIGHT ICA PROX PSV: 84.9 CM/S
VAS RIGHT ICA/CCA PSV: 0.76
VAS RIGHT SUBCLAVIAN PROX EDV: 0 CM/S
VAS RIGHT SUBCLAVIAN PROX PSV: 144 CM/S
VAS RIGHT VERTEBRAL EDV: 11.1 CM/S
VAS RIGHT VERTEBRAL PSV: 73.7 CM/S

## 2025-07-01 PROCEDURE — 93880 EXTRACRANIAL BILAT STUDY: CPT | Performed by: SURGERY

## 2025-07-02 ENCOUNTER — HOSPITAL ENCOUNTER (OUTPATIENT)
Age: 70
Setting detail: OUTPATIENT SURGERY
Discharge: HOME | End: 2025-07-02
Attending: ORTHOPAEDIC SURGERY | Admitting: ORTHOPAEDIC SURGERY
Payer: COMMERCIAL

## 2025-07-02 ENCOUNTER — ANESTHESIA (OUTPATIENT)
Dept: OPERATING ROOM | Age: 70
End: 2025-07-02
Payer: COMMERCIAL

## 2025-07-02 VITALS
TEMPERATURE: 97 F | HEIGHT: 64 IN | OXYGEN SATURATION: 98 % | WEIGHT: 165 LBS | RESPIRATION RATE: 18 BRPM | HEART RATE: 65 BPM | DIASTOLIC BLOOD PRESSURE: 19 MMHG | BODY MASS INDEX: 28.17 KG/M2 | SYSTOLIC BLOOD PRESSURE: 110 MMHG

## 2025-07-02 DIAGNOSIS — Z47.89 ORTHOPEDIC AFTERCARE: Primary | ICD-10-CM

## 2025-07-02 PROCEDURE — 2500000003 HC RX 250 WO HCPCS: Performed by: NURSE ANESTHETIST, CERTIFIED REGISTERED

## 2025-07-02 PROCEDURE — 3700000000 HC ANESTHESIA ATTENDED CARE: Performed by: ORTHOPAEDIC SURGERY

## 2025-07-02 PROCEDURE — 2709999900 HC NON-CHARGEABLE SUPPLY: Performed by: ORTHOPAEDIC SURGERY

## 2025-07-02 PROCEDURE — 29824 SHO ARTHRS SRG DSTL CLAVICLC: CPT | Performed by: ORTHOPAEDIC SURGERY

## 2025-07-02 PROCEDURE — 23430 REPAIR BICEPS TENDON: CPT | Performed by: ORTHOPAEDIC SURGERY

## 2025-07-02 PROCEDURE — 3600000014 HC SURGERY LEVEL 4 ADDTL 15MIN: Performed by: ORTHOPAEDIC SURGERY

## 2025-07-02 PROCEDURE — 7100000001 HC PACU RECOVERY - ADDTL 15 MIN: Performed by: ORTHOPAEDIC SURGERY

## 2025-07-02 PROCEDURE — 6370000000 HC RX 637 (ALT 250 FOR IP): Performed by: ANESTHESIOLOGY

## 2025-07-02 PROCEDURE — 7100000010 HC PHASE II RECOVERY - FIRST 15 MIN: Performed by: ORTHOPAEDIC SURGERY

## 2025-07-02 PROCEDURE — 3700000001 HC ADD 15 MINUTES (ANESTHESIA): Performed by: ORTHOPAEDIC SURGERY

## 2025-07-02 PROCEDURE — 6360000002 HC RX W HCPCS: Performed by: STUDENT IN AN ORGANIZED HEALTH CARE EDUCATION/TRAINING PROGRAM

## 2025-07-02 PROCEDURE — 64415 NJX AA&/STRD BRCH PLXS IMG: CPT | Performed by: STUDENT IN AN ORGANIZED HEALTH CARE EDUCATION/TRAINING PROGRAM

## 2025-07-02 PROCEDURE — 2580000003 HC RX 258: Performed by: ANESTHESIOLOGY

## 2025-07-02 PROCEDURE — 6360000002 HC RX W HCPCS: Performed by: ORTHOPAEDIC SURGERY

## 2025-07-02 PROCEDURE — 29823 SHO ARTHRS SRG XTNSV DBRDMT: CPT | Performed by: ORTHOPAEDIC SURGERY

## 2025-07-02 PROCEDURE — 6360000002 HC RX W HCPCS: Performed by: NURSE ANESTHETIST, CERTIFIED REGISTERED

## 2025-07-02 PROCEDURE — 3600000004 HC SURGERY LEVEL 4 BASE: Performed by: ORTHOPAEDIC SURGERY

## 2025-07-02 PROCEDURE — 6370000000 HC RX 637 (ALT 250 FOR IP): Performed by: ORTHOPAEDIC SURGERY

## 2025-07-02 PROCEDURE — 7100000011 HC PHASE II RECOVERY - ADDTL 15 MIN: Performed by: ORTHOPAEDIC SURGERY

## 2025-07-02 PROCEDURE — 2500000003 HC RX 250 WO HCPCS: Performed by: ORTHOPAEDIC SURGERY

## 2025-07-02 PROCEDURE — C1776 JOINT DEVICE (IMPLANTABLE): HCPCS | Performed by: ORTHOPAEDIC SURGERY

## 2025-07-02 PROCEDURE — 7100000000 HC PACU RECOVERY - FIRST 15 MIN: Performed by: ORTHOPAEDIC SURGERY

## 2025-07-02 PROCEDURE — 2580000003 HC RX 258: Performed by: NURSE ANESTHETIST, CERTIFIED REGISTERED

## 2025-07-02 PROCEDURE — 2720000010 HC SURG SUPPLY STERILE: Performed by: ORTHOPAEDIC SURGERY

## 2025-07-02 DEVICE — PROXIMAL TENODESIS IMPLANT SYSTEM REV: 0
Type: IMPLANTABLE DEVICE | Site: SHOULDER | Status: FUNCTIONAL
Brand: ARTHREX®

## 2025-07-02 RX ORDER — SODIUM CHLORIDE 0.9 % (FLUSH) 0.9 %
5-40 SYRINGE (ML) INJECTION EVERY 12 HOURS SCHEDULED
Status: DISCONTINUED | OUTPATIENT
Start: 2025-07-02 | End: 2025-07-02 | Stop reason: HOSPADM

## 2025-07-02 RX ORDER — SODIUM CHLORIDE 9 MG/ML
INJECTION, SOLUTION INTRAVENOUS PRN
Status: DISCONTINUED | OUTPATIENT
Start: 2025-07-02 | End: 2025-07-02 | Stop reason: HOSPADM

## 2025-07-02 RX ORDER — ROPIVACAINE HYDROCHLORIDE 5 MG/ML
INJECTION, SOLUTION EPIDURAL; INFILTRATION; PERINEURAL
Status: COMPLETED | OUTPATIENT
Start: 2025-07-02 | End: 2025-07-02

## 2025-07-02 RX ORDER — DEXMEDETOMIDINE HYDROCHLORIDE 100 UG/ML
INJECTION, SOLUTION INTRAVENOUS
Status: DISCONTINUED | OUTPATIENT
Start: 2025-07-02 | End: 2025-07-02 | Stop reason: SDUPTHER

## 2025-07-02 RX ORDER — MELOXICAM 7.5 MG/1
7.5 TABLET ORAL ONCE
Status: COMPLETED | OUTPATIENT
Start: 2025-07-02 | End: 2025-07-02

## 2025-07-02 RX ORDER — DIPHENHYDRAMINE HYDROCHLORIDE 50 MG/ML
12.5 INJECTION, SOLUTION INTRAMUSCULAR; INTRAVENOUS
Status: DISCONTINUED | OUTPATIENT
Start: 2025-07-02 | End: 2025-07-02 | Stop reason: HOSPADM

## 2025-07-02 RX ORDER — OXYCODONE HYDROCHLORIDE 5 MG/1
5 TABLET ORAL EVERY 6 HOURS PRN
Qty: 20 TABLET | Refills: 0 | Status: SHIPPED | OUTPATIENT
Start: 2025-07-02 | End: 2025-07-07

## 2025-07-02 RX ORDER — ONDANSETRON 2 MG/ML
INJECTION INTRAMUSCULAR; INTRAVENOUS
Status: DISCONTINUED | OUTPATIENT
Start: 2025-07-02 | End: 2025-07-02 | Stop reason: SDUPTHER

## 2025-07-02 RX ORDER — NALOXONE HYDROCHLORIDE 0.4 MG/ML
INJECTION, SOLUTION INTRAMUSCULAR; INTRAVENOUS; SUBCUTANEOUS PRN
Status: DISCONTINUED | OUTPATIENT
Start: 2025-07-02 | End: 2025-07-02 | Stop reason: HOSPADM

## 2025-07-02 RX ORDER — SCOPOLAMINE 1 MG/3D
PATCH, EXTENDED RELEASE TRANSDERMAL
Status: DISCONTINUED
Start: 2025-07-02 | End: 2025-07-02 | Stop reason: HOSPADM

## 2025-07-02 RX ORDER — BUPIVACAINE HYDROCHLORIDE 2.5 MG/ML
INJECTION, SOLUTION INFILTRATION; PERINEURAL PRN
Status: DISCONTINUED | OUTPATIENT
Start: 2025-07-02 | End: 2025-07-02 | Stop reason: ALTCHOICE

## 2025-07-02 RX ORDER — TRANEXAMIC ACID 100 MG/ML
1000 INJECTION, SOLUTION INTRAVENOUS ONCE
Status: DISCONTINUED | OUTPATIENT
Start: 2025-07-02 | End: 2025-07-02 | Stop reason: HOSPADM

## 2025-07-02 RX ORDER — OXYCODONE HYDROCHLORIDE 5 MG/1
5 TABLET ORAL PRN
Status: DISCONTINUED | OUTPATIENT
Start: 2025-07-02 | End: 2025-07-02 | Stop reason: HOSPADM

## 2025-07-02 RX ORDER — PROPOFOL 10 MG/ML
INJECTION, EMULSION INTRAVENOUS
Status: DISCONTINUED | OUTPATIENT
Start: 2025-07-02 | End: 2025-07-02 | Stop reason: SDUPTHER

## 2025-07-02 RX ORDER — SODIUM CHLORIDE, SODIUM LACTATE, POTASSIUM CHLORIDE, CALCIUM CHLORIDE 600; 310; 30; 20 MG/100ML; MG/100ML; MG/100ML; MG/100ML
INJECTION, SOLUTION INTRAVENOUS
Status: DISCONTINUED | OUTPATIENT
Start: 2025-07-02 | End: 2025-07-02 | Stop reason: SDUPTHER

## 2025-07-02 RX ORDER — SODIUM CHLORIDE 0.9 % (FLUSH) 0.9 %
5-40 SYRINGE (ML) INJECTION PRN
Status: DISCONTINUED | OUTPATIENT
Start: 2025-07-02 | End: 2025-07-02 | Stop reason: HOSPADM

## 2025-07-02 RX ORDER — LABETALOL HYDROCHLORIDE 5 MG/ML
10 INJECTION, SOLUTION INTRAVENOUS
Status: DISCONTINUED | OUTPATIENT
Start: 2025-07-02 | End: 2025-07-02 | Stop reason: HOSPADM

## 2025-07-02 RX ORDER — DEXAMETHASONE SODIUM PHOSPHATE 4 MG/ML
INJECTION, SOLUTION INTRA-ARTICULAR; INTRALESIONAL; INTRAMUSCULAR; INTRAVENOUS; SOFT TISSUE
Status: DISCONTINUED | OUTPATIENT
Start: 2025-07-02 | End: 2025-07-02 | Stop reason: SDUPTHER

## 2025-07-02 RX ORDER — SCOPOLAMINE 1 MG/3D
1 PATCH, EXTENDED RELEASE TRANSDERMAL
Status: DISCONTINUED | OUTPATIENT
Start: 2025-07-02 | End: 2025-07-02 | Stop reason: HOSPADM

## 2025-07-02 RX ORDER — LIDOCAINE HYDROCHLORIDE 10 MG/ML
1 INJECTION, SOLUTION EPIDURAL; INFILTRATION; INTRACAUDAL; PERINEURAL
Status: DISCONTINUED | OUTPATIENT
Start: 2025-07-02 | End: 2025-07-02 | Stop reason: HOSPADM

## 2025-07-02 RX ORDER — PROCHLORPERAZINE EDISYLATE 5 MG/ML
5 INJECTION INTRAMUSCULAR; INTRAVENOUS
Status: DISCONTINUED | OUTPATIENT
Start: 2025-07-02 | End: 2025-07-02 | Stop reason: HOSPADM

## 2025-07-02 RX ORDER — ONDANSETRON 4 MG/1
4 TABLET, ORALLY DISINTEGRATING ORAL 3 TIMES DAILY PRN
Qty: 21 TABLET | Refills: 0 | Status: SHIPPED | OUTPATIENT
Start: 2025-07-02

## 2025-07-02 RX ORDER — IPRATROPIUM BROMIDE AND ALBUTEROL SULFATE 2.5; .5 MG/3ML; MG/3ML
1 SOLUTION RESPIRATORY (INHALATION)
Status: DISCONTINUED | OUTPATIENT
Start: 2025-07-02 | End: 2025-07-02 | Stop reason: HOSPADM

## 2025-07-02 RX ORDER — TRANEXAMIC ACID 10 MG/ML
INJECTION, SOLUTION INTRAVENOUS
Status: DISCONTINUED | OUTPATIENT
Start: 2025-07-02 | End: 2025-07-02 | Stop reason: SDUPTHER

## 2025-07-02 RX ORDER — IPRATROPIUM BROMIDE AND ALBUTEROL SULFATE 2.5; .5 MG/3ML; MG/3ML
1 SOLUTION RESPIRATORY (INHALATION) ONCE
Status: DISCONTINUED | OUTPATIENT
Start: 2025-07-02 | End: 2025-07-02 | Stop reason: HOSPADM

## 2025-07-02 RX ORDER — FENTANYL CITRATE 50 UG/ML
INJECTION, SOLUTION INTRAMUSCULAR; INTRAVENOUS
Status: DISCONTINUED | OUTPATIENT
Start: 2025-07-02 | End: 2025-07-02 | Stop reason: SDUPTHER

## 2025-07-02 RX ORDER — MIDAZOLAM HYDROCHLORIDE 1 MG/ML
2 INJECTION, SOLUTION INTRAMUSCULAR; INTRAVENOUS
Status: DISCONTINUED | OUTPATIENT
Start: 2025-07-02 | End: 2025-07-02 | Stop reason: HOSPADM

## 2025-07-02 RX ORDER — DROPERIDOL 2.5 MG/ML
0.62 INJECTION, SOLUTION INTRAMUSCULAR; INTRAVENOUS
Status: DISCONTINUED | OUTPATIENT
Start: 2025-07-02 | End: 2025-07-02 | Stop reason: HOSPADM

## 2025-07-02 RX ORDER — ROCURONIUM BROMIDE 10 MG/ML
INJECTION, SOLUTION INTRAVENOUS
Status: DISCONTINUED | OUTPATIENT
Start: 2025-07-02 | End: 2025-07-02 | Stop reason: SDUPTHER

## 2025-07-02 RX ORDER — MIDAZOLAM HYDROCHLORIDE 1 MG/ML
INJECTION, SOLUTION INTRAMUSCULAR; INTRAVENOUS
Status: COMPLETED | OUTPATIENT
Start: 2025-07-02 | End: 2025-07-02

## 2025-07-02 RX ORDER — PHENYLEPHRINE HCL IN 0.9% NACL 1 MG/10 ML
SYRINGE (ML) INTRAVENOUS
Status: DISCONTINUED | OUTPATIENT
Start: 2025-07-02 | End: 2025-07-02 | Stop reason: SDUPTHER

## 2025-07-02 RX ORDER — ACETAMINOPHEN 500 MG
1000 TABLET ORAL ONCE
Status: COMPLETED | OUTPATIENT
Start: 2025-07-02 | End: 2025-07-02

## 2025-07-02 RX ORDER — SODIUM CHLORIDE, SODIUM LACTATE, POTASSIUM CHLORIDE, CALCIUM CHLORIDE 600; 310; 30; 20 MG/100ML; MG/100ML; MG/100ML; MG/100ML
INJECTION, SOLUTION INTRAVENOUS CONTINUOUS
Status: DISCONTINUED | OUTPATIENT
Start: 2025-07-02 | End: 2025-07-02 | Stop reason: HOSPADM

## 2025-07-02 RX ORDER — OXYCODONE HYDROCHLORIDE 5 MG/1
10 TABLET ORAL PRN
Status: DISCONTINUED | OUTPATIENT
Start: 2025-07-02 | End: 2025-07-02 | Stop reason: HOSPADM

## 2025-07-02 RX ORDER — CEFAZOLIN SODIUM 1 G/3ML
INJECTION, POWDER, FOR SOLUTION INTRAMUSCULAR; INTRAVENOUS
Status: DISCONTINUED | OUTPATIENT
Start: 2025-07-02 | End: 2025-07-02 | Stop reason: SDUPTHER

## 2025-07-02 RX ORDER — MAGNESIUM HYDROXIDE 1200 MG/15ML
LIQUID ORAL CONTINUOUS PRN
Status: COMPLETED | OUTPATIENT
Start: 2025-07-02 | End: 2025-07-02

## 2025-07-02 RX ADMIN — DEXAMETHASONE SODIUM PHOSPHATE 4 MG: 4 INJECTION, SOLUTION INTRAMUSCULAR; INTRAVENOUS at 08:50

## 2025-07-02 RX ADMIN — ROPIVACAINE HYDROCHLORIDE 20 ML: 5 INJECTION EPIDURAL; INFILTRATION; PERINEURAL at 07:05

## 2025-07-02 RX ADMIN — MIDAZOLAM 2 MG: 1 INJECTION INTRAMUSCULAR; INTRAVENOUS at 07:05

## 2025-07-02 RX ADMIN — Medication 2 AMPULE: at 06:42

## 2025-07-02 RX ADMIN — Medication 50 MCG: at 08:14

## 2025-07-02 RX ADMIN — Medication 100 MCG: at 08:36

## 2025-07-02 RX ADMIN — SODIUM CHLORIDE, SODIUM LACTATE, POTASSIUM CHLORIDE, AND CALCIUM CHLORIDE: .6; .31; .03; .02 INJECTION, SOLUTION INTRAVENOUS at 06:52

## 2025-07-02 RX ADMIN — Medication 100 MCG: at 08:20

## 2025-07-02 RX ADMIN — ONDANSETRON 4 MG: 2 INJECTION INTRAMUSCULAR; INTRAVENOUS at 08:50

## 2025-07-02 RX ADMIN — SODIUM CHLORIDE, SODIUM LACTATE, POTASSIUM CHLORIDE, AND CALCIUM CHLORIDE: .6; .31; .03; .02 INJECTION, SOLUTION INTRAVENOUS at 08:40

## 2025-07-02 RX ADMIN — FENTANYL CITRATE 50 MCG: 50 INJECTION, SOLUTION INTRAMUSCULAR; INTRAVENOUS at 08:42

## 2025-07-02 RX ADMIN — ROCURONIUM BROMIDE 50 MG: 50 INJECTION, SOLUTION INTRAVENOUS at 07:33

## 2025-07-02 RX ADMIN — ACETAMINOPHEN 1000 MG: 500 TABLET ORAL at 06:42

## 2025-07-02 RX ADMIN — TRANEXAMIC ACID 1000 MG: 10 INJECTION, SOLUTION INTRAVENOUS at 07:44

## 2025-07-02 RX ADMIN — CEFAZOLIN 2 G: 1 INJECTION, POWDER, FOR SOLUTION INTRAMUSCULAR; INTRAVENOUS at 07:40

## 2025-07-02 RX ADMIN — PROPOFOL 200 MG: 10 INJECTION, EMULSION INTRAVENOUS at 07:32

## 2025-07-02 RX ADMIN — SODIUM CHLORIDE, SODIUM LACTATE, POTASSIUM CHLORIDE, AND CALCIUM CHLORIDE: .6; .31; .03; .02 INJECTION, SOLUTION INTRAVENOUS at 07:20

## 2025-07-02 RX ADMIN — SUGAMMADEX 200 MG: 100 INJECTION, SOLUTION INTRAVENOUS at 09:10

## 2025-07-02 RX ADMIN — DEXMEDETOMIDINE HCL 3 MCG: 100 INJECTION INTRAVENOUS at 08:03

## 2025-07-02 RX ADMIN — Medication 50 MCG: at 08:09

## 2025-07-02 RX ADMIN — DEXMEDETOMIDINE HCL 3 MCG: 100 INJECTION INTRAVENOUS at 08:38

## 2025-07-02 RX ADMIN — Medication 100 MCG: at 08:33

## 2025-07-02 RX ADMIN — Medication 100 MCG: at 08:26

## 2025-07-02 RX ADMIN — FENTANYL CITRATE 50 MCG: 50 INJECTION, SOLUTION INTRAMUSCULAR; INTRAVENOUS at 07:55

## 2025-07-02 RX ADMIN — Medication 100 MCG: at 08:17

## 2025-07-02 RX ADMIN — MELOXICAM 7.5 MG: 7.5 TABLET ORAL at 06:43

## 2025-07-02 ASSESSMENT — PAIN SCALES - GENERAL
PAINLEVEL_OUTOF10: 0

## 2025-07-02 ASSESSMENT — PAIN - FUNCTIONAL ASSESSMENT: PAIN_FUNCTIONAL_ASSESSMENT: 0-10

## 2025-07-02 NOTE — PROGRESS NOTES
Pt tolerated     RUE     Nerve block well  Without adverse reactions/ no dizziness/tinnitus/ pain- pt was premedicated with        2mg          Versed prior to receiving the nerve block per anesthesia  VSS- Sao2 monitored during the entire procedure- maintained WNL during block procedure.  EKG -NSR- no aberrancy

## 2025-07-02 NOTE — OP NOTE
Operative Note      Patient: Licha Nails  YOB: 1955  MRN: [id number]    Date of Procedure: 7/2/2025    Pre-Op Diagnosis Codes:      * Right bicipital tenosynovitis [M75.21]     * Osteoarthritis of acromioclavicular joint [M19.019]     * Subacromial bursitis of right shoulder joint [M75.51]    Post-Op Diagnosis:   Right bicipital tenosynovitis  Partial incomplete rotator cuff tear  Osteoarthritis of acromioclavicular joint  Subacromial bursitis       Procedure(s):  VIDEO ARTHROSCOPY RIGHT SHOULDER, ROTATOR CUFF DEBRIDEMENT, SUBACROMIAL DECOMPRESSION, DISTAL CLAVICLE RESECTION AND OPEN SUBPECTORAL BICEPS TENODESIS    Surgeon(s):  Robin Soto MD    Assistant:   Surgical Assistant: Ashley Fowler  Fellow: Otis Dial MD    Anesthesia: General    Estimated Blood Loss (mL): Minimal    Complications: None    Specimens:   * No specimens in log *    Implants:  Implant Name Type Inv. Item Serial No.  Lot No. LRB No. Used Action   KIT IMPL SYS PROX TENODESIS W/ BICEPSBUTTON INSRT FIBERLOOP - [id number]  KIT IMPL SYS PROX TENODESIS W/ BICEPSBUTTON INSRT FIBERLOOP  [employer] [id number] Right 1 Implanted         Drains: * No LDAs found *    Findings:  Infection Present At Time Of Surgery (PATOS) (choose all levels that have infection present):  No infection present  Other Findings: Partial articular sided anterior supraspinatus rotator cuff tear-fraying 2 mm.  Positive degenerative type I SLAP tear with associated synovitis at the biceps anchor and bicipital tenosynovitis.  No significant subscapularis tear.  No significant infraspinatus tear.  No gross bursal sided tear.  Positive bursal subacromial bursitis and anterior acromial spur-type I acromion with anterior spur.  Positive acromioclavicular joint arthrosis with associated synovitis.  Positive extra-articular bicipital tenosynovitis    Detailed Description of Procedure:     OPERATIVE INDICATIONS: Patient is a  70 y.o. female with  findings were appreciated. Biceps tendon was tenotomized in anticipation for tenodesis given preoperative symptoms correlating to intraoperative findings. Labrum was debrided to stable base and edge. Articular sided review and probing of the rotator cuff revealed far anterior partial articular sided tear of the supraspinatus and no gross tear of the infraspinatus .  The anterior articular sided tear of the supraspinatus tendon was debrided.  This was roughly 1 to 2 mm.  There is no rotator cuff repair indicated.  There was no gross tear visualized in the subscapularis. No loose bodies in the joint appreciated.    All arthroscopic equipment was removed from the glenohumeral joint and scope placed into the subacromial space when viewing from posteriorly. Direct anterior lateral working portal was made after localizing with a spinal needle. Shaver and electrocautery were introduced to perform a subacromial decompression which there was significant bursitis appreciated.  This was completed as a bursectomy/bursal debridement by shaver.  Viewing laterally and working by Nuno technique, the acromion was re-contoured/shaped with anterior acromial hook/spur taken down by an arthroscopic fly as an acromioplasty.  This provided an improved acromial morphology.  With reviewing there was no gross rotator cuff tear and the cuff moved as one unit.     Attention then turned to the distal clavicle resection where using a 70° scope and working anteriorly by indirect methods, 8-10 mm was resected of the distal clavicle.     All arthroscopic portals were thoroughly irrigated and closed with interrupated 3-0 nylon.  Attention then turned to the subpectoral biceps tenodesis.  A vertical incision was made perpendicular to the inferior palpated border of the pectoralis major tendon, roughly 1cm above and 2cm below this border.  Satisfactory soft tissue planes were developed bluntly.  The subpectoral space was entered after gently

## 2025-07-02 NOTE — PERIOP NOTE
Pt alert and oriented asking questions appropriately   VSS : wife at bedside   Pt returned to  pre block orientation

## 2025-07-02 NOTE — PROGRESS NOTES
Received in PACU. Report received from OR nurse and CRNA. Oral airway in place. Pt sleeping at this time.

## 2025-07-02 NOTE — ANESTHESIA PROCEDURE NOTES
Peripheral Block    Patient location during procedure: pre-op  Reason for block: post-op pain management and at surgeon's request  Start time: 7/2/2025 7:05 AM  End time: 7/2/2025 7:10 AM  Staffing  Performed: anesthesiologist   Anesthesiologist: David Caceres MD  Performed by: David Caceres MD  Authorized by: David Caceres MD    Preanesthetic Checklist  Completed: patient identified, IV checked, site marked, risks and benefits discussed, surgical/procedural consents, equipment checked, pre-op evaluation, timeout performed, anesthesia consent given, oxygen available, monitors applied/VS acknowledged, fire risk safety assessment completed and verbalized and blood product R/B/A discussed and consented  Peripheral Block   Patient position: supine  Prep: ChloraPrep  Provider prep: sterile gloves  Patient monitoring: cardiac monitor, continuous pulse ox, frequent blood pressure checks, IV access, oxygen and responsive to questions  Block type: Brachial plexus  Interscalene  Laterality: right  Injection technique: single-shot  Guidance: ultrasound guided  Local infiltration: lidocaine  Infiltration strength: 1 %  Local infiltration: lidocaine  Dose: 3 mL    Needle   Needle type: insulated echogenic nerve stimulator needle   Needle gauge: 21 G  Needle localization: ultrasound guidance  Needle length: 5 cm  Assessment   Injection assessment: negative aspiration for heme, no paresthesia on injection, local visualized surrounding nerve on ultrasound and no intravascular symptoms  Slow fractionated injection: yes  Hemodynamics: stable  Outcomes: uncomplicated and patient tolerated procedure well    Medications Administered  midazolam (VERSED) injection 2 mg/2mL - IntraVENous   2 mg - 7/2/2025 7:05:00 AM  ropivacaine (NAROPIN) injection 0.5% - Perineural   20 mL - 7/2/2025 7:05:00 AM

## 2025-07-02 NOTE — PROGRESS NOTES
Discharge instructions reviewed with pt and wife both verbalized understanding, phase 2 discharge criteria met ready for discharge.

## 2025-07-02 NOTE — H&P
ORTHOPAEDIC SURGERY INTERVAL H&P    Licha Nails was seen in the preoperative area, where a history and physical examination was reviewed and the patient was examined by me today. There have been no significant clinical changes since the completion of the previous recorded history and physical as well as recent progress notes dated since May 2025 and preop PCP clearance notes dated 6/17/25.  The surgical site was confirmed by the patient and me and the surgical site was marked.     The risks, benefits, and alternatives of the proposed procedure(s) have been explained to the patient (or appropriately confirmed guardian) and understanding was verbalized. Please see outpatient notes for details.  All questions were answered and a signed documented consent has been placed in the patient's chart. The patient wishes to proceed.  On call to the OR.      Electronically signed by: Robin Soto MD,7/2/2025,7:09 AM         Robin Soto MD       Orthopaedic Surgery-Sports Medicine      Disclaimer:  This note was dictated with voice recognition software.  Though review and correction are routinely performed, please contact the office/medical records for any errors requiring correction.

## 2025-07-02 NOTE — DISCHARGE INSTRUCTIONS
Orthopaedic Sports Medicine  Post-Operative Discharge Instructions      Pain Medication/Management  - Narcotic electronic-scribed to pharmacy listed in EPIC (Follow prescription instructions)  - IF taking a narcotic with acetaminophen then do not take additional acetaminophen.  IF not, then Tylenol (650mg) - take 1 tab every 8 hours x 1 week  - Enteric Coated Aspirin (325mg) Over The Counter - take 1 tab daily x 3 weeks with food -  Start in AM on 7/3/25  - Colace (100mg) Over The Counter - take 1 tab twice daily as needed for a stool softener  * Wean off narcotic and start Tylenol (500mg) Over The Counter - take 1-2 tabs every 8 hours as needed for pain    Nonweightbearing right arm, use sling at all times except for hygiene.  Ice right shoulder.  Elevate right  wrist above right elbow and open close hand to circulate blood.  May shower on 7/5/25, pat wounds dry air dry and then place bandaids and/or gauze/tape to cover all wounds and then return to sling use.  Call PT in 24hrs to activate PT referral to start PT on 7/8/25.  Check all paperwork for surgeon's follow up appointment date and time. Call office [phone] with any questions or concerns      * Day of Surgery - If you had a regional nerve block (extremity was numbed by anesthesia), it will wear off in 6-16 hours after surgery.  It is recommended that you start the narcotic prescription once you get home to stay ahead of pain control even if the nerve block has not worn off yet.  This will help limit severe pain from waking you up.  It is also reasonable to set your alarm for every 6 hours so that you don't get behind in your pain control.    * Elevation and Ice - For lower extremity surgery, elevate the operative extremity with rolled towels / pillows placed under the ankle/calf as tolerated.  Move the position of towels or pillows around every so often to limit undue pressure to the back of the calf/heel.  NEVER place pillows or blankets under the  walking until the block wears off, ie:  crutches, walker, support person   If you continue to feel the effects of the nerve block for longer than 72 hours- call Lise Vaibhav at [phone] and ask to speak with the Anesthesiologist on call.2 puffs each nostril 1/2 hour pre-op.2 puffs each nostril 1/2 hour pre-op.    ANESTHESIA DISCHARGE INSTRUCTIONS    You are under the influence of drugs- do not drink alcohol, drive a car, operate machinery(such as power tools, kitchen appliances, etc), sign legal documents, or make any important decisions for 24 hours (or while on pain medications).   Children should not ride bikes or skate boards or play on gym sets  for 24 hours after surgery.  A responsible adult should be with you for 24 hours.  Rest at home today- increase activity as tolerated.  Progress slowly to a regular diet unless your physician has instructed you otherwise. Drink plenty of water.    CALL YOUR DOCTOR IF YOU:  Have moderate to severe nausea or vomiting AND are unable to hold down fluids or prescribed medications.  Have bright red bloody drainage from your dressing that won't stop oozing.  Do not get relief with your pain medication    NORMAL (POSSIBLE) SIDE EFFECTS FROM ANESTHESIA:     Confusion, temporary memory loss, delayed reaction times in the first 24 hours  Lightheadedness, dizziness, difficulty focusing, blurred vision  Nausea/vomiting can happen  Shivering, feeling cold, sore throat, cough and muscle aches should stop within 24-48 hours  Trouble urinating - call your surgeon if it has been more than 8 hrs  Bruising or soreness at the IV site - call if it remains red, firm or there is drainage             FEMALES OF CHILDBEARING AGE WHO ARE TAKING BIRTH CONTROL PILLS:  You may have received a medication during your procedure that interferes with the   actions of birth control pills (Bridion or Emend). Use some other kind of birth control in addition to your pills, like a condom, for 1 month after

## 2025-07-02 NOTE — ANESTHESIA POSTPROCEDURE EVALUATION
Department of Anesthesiology  Postprocedure Note    Patient: Licha Nails  MRN: [id number]  YOB: 1955  Date of evaluation: 7/2/2025    Procedure Summary       Date: 07/02/25 Room / Location: [address]    Anesthesia Start: 0725 Anesthesia Stop: 0921    Procedure: VIDEO ARTHROSCOPY RIGHT SHOULDER, ROTATOR CUFF DEBRIDEMENT, SUBACROMIAL DECOMPRESSION, DISTAL CLAVICLE RESECTION AND OPEN SUBPECTORAL BICEPS TENODESIS (Right: Shoulder) Diagnosis:       Right bicipital tenosynovitis      Osteoarthritis of acromioclavicular joint      Subacromial bursitis of right shoulder joint      (Right bicipital tenosynovitis [M75.21])      (Osteoarthritis of acromioclavicular joint [M19.019])      (Subacromial bursitis of right shoulder joint [M75.51])    Surgeons: Robin Soto MD Responsible Provider: David Caceres MD    Anesthesia Type: general, regional ASA Status: 3            Anesthesia Type: No value filed.    Socorro Phase I: Socorro Score: 9    Socorro Phase II: Socorro Score: 9    Anesthesia Post Evaluation    Comments: Postoperative Anesthesia Note    Name:    Licha Nails  MRN:      [id number]    Patient Vitals in the past 12 hrs:  07/02/25 1030, BP:(!) 110/19, Temp:97 °F (36.1 °C), Temp src:Oral, Pulse:65, Resp:18, SpO2:98 %  07/02/25 1015, BP:(!) 110/46, Pulse:65, Resp:17, SpO2:96 %  07/02/25 1000, BP:(!) 116/56, Temp:97.6 °F (36.4 °C), Temp src:Oral, Pulse:62, Resp:19, SpO2:91 %  07/02/25 0952, BP:(!) 114/53, Pulse:64, Resp:15, SpO2:92 %  07/02/25 0945, BP:(!) 108/51, Temp:97.3 °F (36.3 °C), Temp src:Temporal, Pulse:66, Resp:19, SpO2:93 %  07/02/25 0937, BP:(!) 110/46, Pulse:68, Resp:16, SpO2:95 %  07/02/25 0932, BP:(!) 113/57, Pulse:69, Resp:17, SpO2:95 %  07/02/25 0927, BP:(!) 105/45, Pulse:69, Resp:18, SpO2:95 %  07/02/25 0922, BP:(!) 109/39, Temp:96.9 °F (36.1 °C), Temp src:Temporal, Pulse:73, Resp:20, SpO2:92 %  07/02/25 0710, BP:120/69, Pulse:73, Resp:20, SpO2:97

## 2025-07-03 ENCOUNTER — TELEPHONE (OUTPATIENT)
Dept: ORTHOPEDIC SURGERY | Age: 70
End: 2025-07-03

## 2025-07-03 NOTE — TELEPHONE ENCOUNTER
Pt's wife, Elvis, called and left me a VM this morning wanting to confirm that Pt's post-op PT appointment had been scheduled at the correct location. Attempted to call Elvis back to discuss, but did not answer so LVM asking that she or Pt call me back at my direct line ([phone]) at their convenience.     Also attempted to call Pt to discuss as well, but she did not answer. LVM asking that she or Elvis call me back at my direct line ([phone]) at their convenience.

## 2025-07-03 NOTE — TELEPHONE ENCOUNTER
Pt called back, and confirmed that we do want her scheduled at the [place] office. Let her know that on my end it does appear that is where she is scheduled, but if I find out otherwise I will call her back and let her know. She expressed understanding.

## 2025-07-07 ENCOUNTER — APPOINTMENT (OUTPATIENT)
Dept: PHYSICAL THERAPY | Age: 70
End: 2025-07-07
Payer: COMMERCIAL

## 2025-07-07 ENCOUNTER — HOSPITAL ENCOUNTER (OUTPATIENT)
Dept: PHYSICAL THERAPY | Age: 70
Setting detail: THERAPIES SERIES
Discharge: HOME | End: 2025-07-07
Payer: COMMERCIAL

## 2025-07-07 PROCEDURE — 97140 MANUAL THERAPY 1/> REGIONS: CPT | Performed by: PHYSICAL THERAPIST

## 2025-07-07 PROCEDURE — 97110 THERAPEUTIC EXERCISES: CPT | Performed by: PHYSICAL THERAPIST

## 2025-07-07 PROCEDURE — 97161 PT EVAL LOW COMPLEX 20 MIN: CPT | Performed by: PHYSICAL THERAPIST

## 2025-07-07 NOTE — PLAN OF CARE
coordination, kinesthetic sense, posture, and/or proprioception for sitting and/or standing activities. Provided HEP review and/or progression.  (89361) MANUAL THERAPY -  Manual therapy techniques, 1 or more regions, each 15 minutes (Mobilization/manipulation, manual lymphatic drainage, manual traction) for the purpose of modulating pain, promoting relaxation,  increasing ROM, reducing/eliminating soft tissue swelling/inflammation/restriction, improving soft tissue extensibility and allowing for proper ROM for normal function with self care, mobility, lifting and ambulation    GOALS     Patient stated goal: return to ADL's  [] Progressing: [] Met: [] Not Met: [] Adjusted    Therapist goals for Patient:   Short Term Goals: To be achieved in: 2 weeks  Independent in HEP and progression per patient tolerance, in order to prevent re-injury.   [] Progressing: [] Met: [] Not Met: [] Adjusted  Patient will have a decrease in pain to <3/10 to facilitate improvement in movement, function, and ADLs as indicated by Functional Deficits.  [] Progressing: [] Met: [] Not Met: [] Adjusted    IF APPLICABLE:  [] Patient to demonstrate independence in wear and care for custom orthotic device. (Only if applicable for orthotic eval)     Long Term Goals: To be achieved in: 8-12 weeks  Disability index score of 40% or less for the Quick DASH to assist with reaching prior level of function with activities such as washing back.  [] Progressing: [] Met: [] Not Met: [] Adjusted  Patient will demonstrate increased AROM of R shoulder  to 160 F/abd, ER/IR 80 or better without pain to allow for proper joint functioning to enable patient to reach up into cabinet.   [] Progressing: [] Met: [] Not Met: [] Adjusted  Patient will demonstrate increased Strength of R UE to at least 4+/5 throughout without pain to allow for proper functional mobility to enable patient to return to reach up into cabinet, lift dishes to cabinet.   [] Progressing: [] Met:

## 2025-07-09 ENCOUNTER — TELEPHONE (OUTPATIENT)
Dept: ORTHOPEDIC SURGERY | Age: 70
End: 2025-07-09

## 2025-07-09 NOTE — TELEPHONE ENCOUNTER
Faxed records for 4/2025 to 6/2025 (Charles) to [facility] at [phone]    Claim #  [id number]

## 2025-07-10 ENCOUNTER — HOSPITAL ENCOUNTER (OUTPATIENT)
Dept: PHYSICAL THERAPY | Age: 70
Setting detail: THERAPIES SERIES
Discharge: HOME | End: 2025-07-10
Payer: COMMERCIAL

## 2025-07-10 PROCEDURE — 97110 THERAPEUTIC EXERCISES: CPT | Performed by: PHYSICAL THERAPIST

## 2025-07-10 PROCEDURE — 97140 MANUAL THERAPY 1/> REGIONS: CPT | Performed by: PHYSICAL THERAPIST

## 2025-07-10 NOTE — FLOWSHEET NOTE
extensibility and allowing for proper ROM for normal function with self care, mobility, lifting and ambulation    GOALS     Patient stated goal: return to ADL's  [] Progressing: [] Met: [] Not Met: [] Adjusted    Therapist goals for Patient:   Short Term Goals: To be achieved in: 2 weeks  Independent in HEP and progression per patient tolerance, in order to prevent re-injury.   [] Progressing: [] Met: [] Not Met: [] Adjusted  Patient will have a decrease in pain to <3/10 to facilitate improvement in movement, function, and ADLs as indicated by Functional Deficits.  [] Progressing: [] Met: [] Not Met: [] Adjusted    IF APPLICABLE:  [] Patient to demonstrate independence in wear and care for custom orthotic device. (Only if applicable for orthotic eval)     Long Term Goals: To be achieved in: 8-12 weeks  Disability index score of 40% or less for the Quick DASH to assist with reaching prior level of function with activities such as washing back.  [] Progressing: [] Met: [] Not Met: [] Adjusted  Patient will demonstrate increased AROM of R shoulder  to 160 F/abd, ER/IR 80 or better without pain to allow for proper joint functioning to enable patient to reach up into cabinet.   [] Progressing: [] Met: [] Not Met: [] Adjusted  Patient will demonstrate increased Strength of R UE to at least 4+/5 throughout without pain to allow for proper functional mobility to enable patient to return to reach up into cabinet, lift dishes to cabinet.   [] Progressing: [] Met: [] Not Met: [] Adjusted  Patient will return to sleeping without increased symptoms or restriction.   [] Progressing: [] Met: [] Not Met: [] Adjusted  Pt. To be able to wash her back without limitation(patient specific functional goal)    [] Progressing: [] Met: [] Not Met: [] Adjusted         Overall Progression Towards Functional goals/ Treatment Progress Update:  [] Patient is progressing as expected towards functional goals listed.    [] Progression is slowed due

## 2025-07-14 ENCOUNTER — HOSPITAL ENCOUNTER (OUTPATIENT)
Dept: PHYSICAL THERAPY | Age: 70
Setting detail: THERAPIES SERIES
Discharge: HOME | End: 2025-07-14
Payer: COMMERCIAL

## 2025-07-14 PROCEDURE — 97140 MANUAL THERAPY 1/> REGIONS: CPT | Performed by: PHYSICAL THERAPIST

## 2025-07-14 PROCEDURE — 97110 THERAPEUTIC EXERCISES: CPT | Performed by: PHYSICAL THERAPIST

## 2025-07-14 NOTE — FLOWSHEET NOTE
return to sleeping without increased symptoms or restriction.   [] Progressing: [] Met: [] Not Met: [] Adjusted  Pt. To be able to wash her back without limitation(patient specific functional goal)    [] Progressing: [] Met: [] Not Met: [] Adjusted         Overall Progression Towards Functional goals/ Treatment Progress Update:  [] Patient is progressing as expected towards functional goals listed.    [] Progression is slowed due to complexities/Impairments listed.  [] Progression has been slowed due to co-morbidities.  [x] Plan just implemented, too soon (<30days) to assess goals progression   [] Goals require adjustment due to lack of progress  [] Patient is not progressing as expected and requires additional follow up with physician  [] Other:     TREATMENT PLAN     Frequency/Duration: 2x/week for 8-12 weeks for the following treatment interventions:    Interventions:  Therapeutic Exercise (71340) including: strength training, ROM, and functional mobility  Neuromuscular Re-education (05800) activation and proprioception, including postural re-education.    Manual Therapy (16215) as indicated to include: Passive Range of Motion, Gr I-IV mobilizations, and Soft Tissue Mobilization  Modalities as needed that may include: Cryotherapy and Electrical Stimulation  Patient education on joint protection, postural re-education, activity modification, and progression of HEP    Plan: Cont POC- Continue emphasis/focus on exercise progression, promoting relaxation, increasing ROM, and improving postural awareness. Next visit plan to add new exercises   Progress with submax isos and RS as able.  Electronically Signed by Flor Guerrero PT , MSPT, OMT-C [id number]   Date: 07/14/2025   Note: Portions of this note have been templated and/or copied from initial evaluation, reassessments and prior notes for documentation efficiency.  Note: If patient does not return for scheduled/recommended follow up visits, this note will serve as a

## 2025-07-15 ENCOUNTER — TELEPHONE (OUTPATIENT)
Dept: ORTHOPEDIC SURGERY | Age: 70
End: 2025-07-15

## 2025-07-15 ENCOUNTER — OFFICE VISIT (OUTPATIENT)
Dept: ORTHOPEDIC SURGERY | Age: 70
End: 2025-07-15

## 2025-07-15 VITALS — HEIGHT: 64 IN | WEIGHT: 165 LBS | BODY MASS INDEX: 28.17 KG/M2

## 2025-07-15 DIAGNOSIS — Z47.89 ORTHOPEDIC AFTERCARE: Primary | ICD-10-CM

## 2025-07-15 PROCEDURE — 99024 POSTOP FOLLOW-UP VISIT: CPT | Performed by: ORTHOPAEDIC SURGERY

## 2025-07-15 NOTE — PROGRESS NOTES
POST OPERATIVE ORTHOPAEDIC NOTE    DOS: 7/2/2025  PROCEDURES: Right shoulder arthroscopic rotator cuff debridement, subacromial decompression, distal clavicle resection and open subpectoral biceps tenodesis    The patient has been recovering. The patient states the pain is 2-3/10 pain.  The patient has started PT. she is accompanied by her partner.  She has been nonweightbearing in the sling    Focused pertinent physical examination of the operative extremity:  Wounds C/D/I, well healing surgical incisions  No erythema or drainage  Skin intact throughout  5/5 G IO EPL  SILT Ax, R, U, M  +2 radial pulse     Diagnosis Orders   1. Orthopedic aftercare          Assessment and plan: The patient is now 13 days status post the above listed procedure and is recovering    .    --Greater than 50% of the time (11/20 minutes) was spent coordinating care and discussing postoperative recovery course  - I had a pleasant discussion with the patient today and reviewed with her intraoperative procedures performed as well as arthroscopic photos  - Sutures were removed and Steri-Strips were placed.  Patient was educated as to scar tissue massage  - Nonweightbearing and sling use x 6 weeks  - Formal physical therapy will continue to follow biceps tenodesis protocol with progressions and PROM while protecting biceps tenodesis site  -Continue aspirin 325 mg p.o. daily for total of 3 weeks for DVT/VTE prophylaxis.  OTC Tylenol ice and heat for symptomatic relief  -All questions answered to the patient's satisfaction and the patient expressed understanding and agreement with the above listed treatment plan  -Follow up in 4 weeks time for routine 6-week postop visit  -Thank you for the clinical consultation and allowing me to participate in the patient's care.      Electronically signed by Robin Soto MD on 7/15/25 at 12:21 PM EDT         Robin Soto MD       Orthopaedic Surgery-Sports Medicine      Disclaimer:  This note was

## 2025-07-17 ENCOUNTER — HOSPITAL ENCOUNTER (OUTPATIENT)
Dept: PHYSICAL THERAPY | Age: 70
Setting detail: THERAPIES SERIES
Discharge: HOME | End: 2025-07-17
Payer: COMMERCIAL

## 2025-07-17 PROCEDURE — 97110 THERAPEUTIC EXERCISES: CPT | Performed by: PHYSICAL THERAPIST

## 2025-07-17 PROCEDURE — 97140 MANUAL THERAPY 1/> REGIONS: CPT | Performed by: PHYSICAL THERAPIST

## 2025-07-17 PROCEDURE — 97112 NEUROMUSCULAR REEDUCATION: CPT | Performed by: PHYSICAL THERAPIST

## 2025-07-17 NOTE — FLOWSHEET NOTE
[facility] - Outpatient Rehabilitation and Therapy: [address], [place] [address] office: [phone] fax: [phone]         Physical Therapy: TREATMENT/PROGRESS NOTE   Patient: Licha Nails (70 y.o. female)   Examination Date: 2025   :  1955 MRN: [id number]   Visit #: 4   Insurance Allowable Auth Needed   60 +estimate []Yes    [x]No    Insurance: Payor: CIGNA / Plan: CIGNA OPEN ACCESS PLUS (OAP) / Product Type: *No Product type* /   Insurance ID: [id number] - (Commercial)  Secondary Insurance (if applicable):    Treatment Diagnosis: Right shoulder pain M25.511      Medical Diagnosis:  Orthopedic aftercare [Z47.89]   Referring Physician: Robin Soto MD  PCP: Daniel Singh DO     Plan of care signed (Y/N):     Date of Patient follow up with Physician:      Plan of Care Report: NO  POC update due: (10 visits /OR AUTH LIMITS, whichever is less)  2025                                             Medical History:  Comorbidities:  Hypertension  Osteoporosis/Osteopenia  Osteoarthritis  Other: kidney stones  Relevant Medical History: underwent R RTC repair, SAD, distal clavicle resection and biceps tenodesis.**S/P ARTHROSCOPIC RIGHT SHOULDER, ROTATOR CUFF DEBRIDEMENT, SUBACROMIAL DECOMPRESSION, DISTAL CLAVICLE RESECTION AND OPEN SUBPECTORAL BICEPS TENODESIS ON 2025                                          Precautions/ Contra-indications:           Latex allergy:  NO  Pacemaker:    NO  Contraindications for Manipulation: recent surgical history (relative) and ASA full dose  Date of Surgery: 25  Other:    Red Flags:  None    Suicide Screening:   The patient did not verbalize a primary behavioral concern, suicidal ideation, suicidal intent, or demonstrate suicidal behaviors.    Preferred Language for Healthcare:   [x] English       [] other:    SUBJECTIVE EXAMINATION     Patient stated complaint: pt. Reports some resting pain yesterday and this

## 2025-07-21 ENCOUNTER — HOSPITAL ENCOUNTER (OUTPATIENT)
Dept: PHYSICAL THERAPY | Age: 70
Setting detail: THERAPIES SERIES
Discharge: HOME | End: 2025-07-21
Payer: COMMERCIAL

## 2025-07-21 PROCEDURE — 97110 THERAPEUTIC EXERCISES: CPT | Performed by: PHYSICAL THERAPIST

## 2025-07-21 PROCEDURE — 97140 MANUAL THERAPY 1/> REGIONS: CPT | Performed by: PHYSICAL THERAPIST

## 2025-07-21 PROCEDURE — 97112 NEUROMUSCULAR REEDUCATION: CPT | Performed by: PHYSICAL THERAPIST

## 2025-07-21 NOTE — FLOWSHEET NOTE
[facility] - Outpatient Rehabilitation and Therapy: [address], [place] [address] office: [phone] fax: [phone]         Physical Therapy: TREATMENT/PROGRESS NOTE   Patient: Licha Nails (70 y.o. female)   Examination Date: 2025   :  1955 MRN: [id number]   Visit #: 5   Insurance Allowable Auth Needed   60 +estimate []Yes    [x]No    Insurance: Payor: CIGNA / Plan: CIGNA OPEN ACCESS PLUS (OAP) / Product Type: *No Product type* /   Insurance ID: [id number] - (Commercial)  Secondary Insurance (if applicable):    Treatment Diagnosis: Right shoulder pain M25.511      Medical Diagnosis:  Orthopedic aftercare [Z47.89]   Referring Physician: Robin Soto MD  PCP: Daniel Singh DO     Plan of care signed (Y/N):     Date of Patient follow up with Physician:      Plan of Care Report: NO  POC update due: (10 visits /OR AUTH LIMITS, whichever is less)  2025                                             Medical History:  Comorbidities:  Hypertension  Osteoporosis/Osteopenia  Osteoarthritis  Other: kidney stones  Relevant Medical History: underwent R RTC repair, SAD, distal clavicle resection and biceps tenodesis.**S/P ARTHROSCOPIC RIGHT SHOULDER, ROTATOR CUFF DEBRIDEMENT, SUBACROMIAL DECOMPRESSION, DISTAL CLAVICLE RESECTION AND OPEN SUBPECTORAL BICEPS TENODESIS ON 2025                                          Precautions/ Contra-indications:           Latex allergy:  NO  Pacemaker:    NO  Contraindications for Manipulation: recent surgical history (relative) and ASA full dose  Date of Surgery: 25  Other:    Red Flags:  None    Suicide Screening:   The patient did not verbalize a primary behavioral concern, suicidal ideation, suicidal intent, or demonstrate suicidal behaviors.    Preferred Language for Healthcare:   [x] English       [] other:    SUBJECTIVE EXAMINATION     Patient stated complaint: pt. Reports that she is feeling better, slowly but 
Yes

## 2025-07-24 ENCOUNTER — HOSPITAL ENCOUNTER (OUTPATIENT)
Dept: PHYSICAL THERAPY | Age: 70
Setting detail: THERAPIES SERIES
Discharge: HOME | End: 2025-07-24
Payer: COMMERCIAL

## 2025-07-24 PROCEDURE — 97110 THERAPEUTIC EXERCISES: CPT | Performed by: PHYSICAL THERAPIST

## 2025-07-24 PROCEDURE — 97140 MANUAL THERAPY 1/> REGIONS: CPT | Performed by: PHYSICAL THERAPIST

## 2025-07-24 PROCEDURE — 97112 NEUROMUSCULAR REEDUCATION: CPT | Performed by: PHYSICAL THERAPIST

## 2025-07-24 NOTE — FLOWSHEET NOTE
joint functioning to enable patient to reach up into cabinet.   [] Progressing: [] Met: [] Not Met: [] Adjusted  Patient will demonstrate increased Strength of R UE to at least 4+/5 throughout without pain to allow for proper functional mobility to enable patient to return to reach up into cabinet, lift dishes to cabinet.   [] Progressing: [] Met: [] Not Met: [] Adjusted  Patient will return to sleeping without increased symptoms or restriction.   [] Progressing: [] Met: [] Not Met: [] Adjusted  Pt. To be able to wash her back without limitation(patient specific functional goal)    [] Progressing: [] Met: [] Not Met: [] Adjusted         Overall Progression Towards Functional goals/ Treatment Progress Update:  [] Patient is progressing as expected towards functional goals listed.    [] Progression is slowed due to complexities/Impairments listed.  [] Progression has been slowed due to co-morbidities.  [x] Plan just implemented, too soon (<30days) to assess goals progression   [] Goals require adjustment due to lack of progress  [] Patient is not progressing as expected and requires additional follow up with physician  [] Other:     TREATMENT PLAN     Frequency/Duration: 2x/week for 8-12 weeks for the following treatment interventions:    Interventions:  Therapeutic Exercise (67119) including: strength training, ROM, and functional mobility  Neuromuscular Re-education (37873) activation and proprioception, including postural re-education.    Manual Therapy (69431) as indicated to include: Passive Range of Motion, Gr I-IV mobilizations, and Soft Tissue Mobilization  Modalities as needed that may include: Cryotherapy and Electrical Stimulation  Patient education on joint protection, postural re-education, activity modification, and progression of HEP    Plan: Cont POC- Continue emphasis/focus on exercise progression, promoting relaxation, increasing ROM, and improving postural awareness. Next visit plan to add new

## 2025-07-28 ENCOUNTER — HOSPITAL ENCOUNTER (OUTPATIENT)
Dept: PHYSICAL THERAPY | Age: 70
Setting detail: THERAPIES SERIES
Discharge: HOME | End: 2025-07-28
Payer: COMMERCIAL

## 2025-07-28 PROCEDURE — 97140 MANUAL THERAPY 1/> REGIONS: CPT | Performed by: PHYSICAL THERAPIST

## 2025-07-28 PROCEDURE — 97110 THERAPEUTIC EXERCISES: CPT | Performed by: PHYSICAL THERAPIST

## 2025-07-28 PROCEDURE — 97112 NEUROMUSCULAR REEDUCATION: CPT | Performed by: PHYSICAL THERAPIST

## 2025-07-28 NOTE — FLOWSHEET NOTE
[facility] - Outpatient Rehabilitation and Therapy: [address], [place] [address] office: [phone] fax: [phone]         Physical Therapy: TREATMENT/PROGRESS NOTE   Patient: Licha Nails (70 y.o. female)   Examination Date: 2025   :  1955 MRN: [id number]   Visit #: 7   Insurance Allowable Auth Needed   60 +estimate []Yes    [x]No    Insurance: Payor: CIGNA / Plan: CIGNA OPEN ACCESS PLUS (OAP) / Product Type: *No Product type* /   Insurance ID: [id number] - (Commercial)  Secondary Insurance (if applicable):    Treatment Diagnosis: Right shoulder pain M25.511      Medical Diagnosis:  Orthopedic aftercare [Z47.89]   Referring Physician: Robin Soto MD  PCP: Daniel Singh DO     Plan of care signed (Y/N):     Date of Patient follow up with Physician:      Plan of Care Report: NO  POC update due: (10 visits /OR AUTH LIMITS, whichever is less)  2025                                             Medical History:  Comorbidities:  Hypertension  Osteoporosis/Osteopenia  Osteoarthritis  Other: kidney stones  Relevant Medical History: underwent R RTC repair, SAD, distal clavicle resection and biceps tenodesis.**S/P ARTHROSCOPIC RIGHT SHOULDER, ROTATOR CUFF DEBRIDEMENT, SUBACROMIAL DECOMPRESSION, DISTAL CLAVICLE RESECTION AND OPEN SUBPECTORAL BICEPS TENODESIS ON 2025                                          Precautions/ Contra-indications:           Latex allergy:  NO  Pacemaker:    NO  Contraindications for Manipulation: recent surgical history (relative) and ASA full dose  Date of Surgery: 25  Other:    Red Flags:  None    Suicide Screening:   The patient did not verbalize a primary behavioral concern, suicidal ideation, suicidal intent, or demonstrate suicidal behaviors.    Preferred Language for Healthcare:   [x] English       [] other:    SUBJECTIVE EXAMINATION     Patient stated complaint: pt. Reports that she is doing better. Still feeling

## 2025-07-31 ENCOUNTER — HOSPITAL ENCOUNTER (OUTPATIENT)
Dept: PHYSICAL THERAPY | Age: 70
Setting detail: THERAPIES SERIES
Discharge: HOME | End: 2025-07-31
Payer: COMMERCIAL

## 2025-07-31 PROCEDURE — 97110 THERAPEUTIC EXERCISES: CPT | Performed by: PHYSICAL THERAPIST

## 2025-07-31 PROCEDURE — 97140 MANUAL THERAPY 1/> REGIONS: CPT | Performed by: PHYSICAL THERAPIST

## 2025-07-31 PROCEDURE — 97112 NEUROMUSCULAR REEDUCATION: CPT | Performed by: PHYSICAL THERAPIST

## 2025-08-04 ENCOUNTER — HOSPITAL ENCOUNTER (OUTPATIENT)
Dept: PHYSICAL THERAPY | Age: 70
Setting detail: THERAPIES SERIES
Discharge: HOME | End: 2025-08-04
Payer: COMMERCIAL

## 2025-08-04 PROCEDURE — 97112 NEUROMUSCULAR REEDUCATION: CPT | Performed by: PHYSICAL THERAPIST

## 2025-08-04 PROCEDURE — 97110 THERAPEUTIC EXERCISES: CPT | Performed by: PHYSICAL THERAPIST

## 2025-08-04 PROCEDURE — 97140 MANUAL THERAPY 1/> REGIONS: CPT | Performed by: PHYSICAL THERAPIST

## 2025-08-07 ENCOUNTER — HOSPITAL ENCOUNTER (OUTPATIENT)
Dept: PHYSICAL THERAPY | Age: 70
Setting detail: THERAPIES SERIES
Discharge: HOME | End: 2025-08-07
Payer: COMMERCIAL

## 2025-08-07 PROCEDURE — 97112 NEUROMUSCULAR REEDUCATION: CPT | Performed by: PHYSICAL THERAPIST

## 2025-08-07 PROCEDURE — 97140 MANUAL THERAPY 1/> REGIONS: CPT | Performed by: PHYSICAL THERAPIST

## 2025-08-07 PROCEDURE — 97110 THERAPEUTIC EXERCISES: CPT | Performed by: PHYSICAL THERAPIST

## 2025-08-11 ENCOUNTER — HOSPITAL ENCOUNTER (OUTPATIENT)
Dept: PHYSICAL THERAPY | Age: 70
Setting detail: THERAPIES SERIES
Discharge: HOME | End: 2025-08-11
Payer: COMMERCIAL

## 2025-08-11 PROCEDURE — 97110 THERAPEUTIC EXERCISES: CPT | Performed by: PHYSICAL THERAPIST

## 2025-08-11 PROCEDURE — 97112 NEUROMUSCULAR REEDUCATION: CPT | Performed by: PHYSICAL THERAPIST

## 2025-08-11 PROCEDURE — 97140 MANUAL THERAPY 1/> REGIONS: CPT | Performed by: PHYSICAL THERAPIST

## 2025-08-12 ENCOUNTER — OFFICE VISIT (OUTPATIENT)
Dept: ORTHOPEDIC SURGERY | Age: 70
End: 2025-08-12

## 2025-08-12 VITALS — HEIGHT: 64 IN | WEIGHT: 165 LBS | BODY MASS INDEX: 28.17 KG/M2

## 2025-08-12 DIAGNOSIS — Z47.89 ORTHOPEDIC AFTERCARE: Primary | ICD-10-CM

## 2025-08-12 PROCEDURE — 99024 POSTOP FOLLOW-UP VISIT: CPT | Performed by: ORTHOPAEDIC SURGERY

## 2025-08-14 ENCOUNTER — HOSPITAL ENCOUNTER (OUTPATIENT)
Dept: PHYSICAL THERAPY | Age: 70
Setting detail: THERAPIES SERIES
Discharge: HOME | End: 2025-08-14
Payer: COMMERCIAL

## 2025-08-14 PROCEDURE — 97140 MANUAL THERAPY 1/> REGIONS: CPT | Performed by: PHYSICAL THERAPIST

## 2025-08-14 PROCEDURE — 97110 THERAPEUTIC EXERCISES: CPT | Performed by: PHYSICAL THERAPIST

## 2025-08-14 PROCEDURE — 97112 NEUROMUSCULAR REEDUCATION: CPT | Performed by: PHYSICAL THERAPIST

## 2025-08-18 ENCOUNTER — HOSPITAL ENCOUNTER (OUTPATIENT)
Dept: PHYSICAL THERAPY | Age: 70
Setting detail: THERAPIES SERIES
Discharge: HOME | End: 2025-08-18
Payer: COMMERCIAL

## 2025-08-18 PROCEDURE — 97110 THERAPEUTIC EXERCISES: CPT | Performed by: PHYSICAL THERAPIST

## 2025-08-18 PROCEDURE — 97112 NEUROMUSCULAR REEDUCATION: CPT | Performed by: PHYSICAL THERAPIST

## 2025-08-18 PROCEDURE — 97140 MANUAL THERAPY 1/> REGIONS: CPT | Performed by: PHYSICAL THERAPIST

## 2025-08-21 ENCOUNTER — HOSPITAL ENCOUNTER (OUTPATIENT)
Dept: PHYSICAL THERAPY | Age: 70
Setting detail: THERAPIES SERIES
Discharge: HOME | End: 2025-08-21
Payer: COMMERCIAL

## 2025-08-21 PROCEDURE — 97110 THERAPEUTIC EXERCISES: CPT | Performed by: PHYSICAL THERAPIST

## 2025-08-21 PROCEDURE — 97140 MANUAL THERAPY 1/> REGIONS: CPT | Performed by: PHYSICAL THERAPIST

## 2025-08-21 PROCEDURE — 97112 NEUROMUSCULAR REEDUCATION: CPT | Performed by: PHYSICAL THERAPIST

## 2025-08-22 ENCOUNTER — APPOINTMENT (OUTPATIENT)
Dept: PHYSICAL THERAPY | Age: 70
End: 2025-08-22
Payer: COMMERCIAL

## 2025-08-25 ENCOUNTER — PATIENT MESSAGE (OUTPATIENT)
Dept: FAMILY MEDICINE CLINIC | Age: 70
End: 2025-08-25

## 2025-08-25 ENCOUNTER — APPOINTMENT (OUTPATIENT)
Dept: PHYSICAL THERAPY | Age: 70
End: 2025-08-25
Payer: COMMERCIAL

## 2025-08-26 ENCOUNTER — HOSPITAL ENCOUNTER (OUTPATIENT)
Dept: PHYSICAL THERAPY | Age: 70
Setting detail: THERAPIES SERIES
Discharge: HOME | End: 2025-08-26
Payer: COMMERCIAL

## 2025-08-26 PROCEDURE — 97110 THERAPEUTIC EXERCISES: CPT | Performed by: PHYSICAL THERAPIST

## 2025-08-26 PROCEDURE — 97140 MANUAL THERAPY 1/> REGIONS: CPT | Performed by: PHYSICAL THERAPIST

## 2025-08-26 PROCEDURE — 97112 NEUROMUSCULAR REEDUCATION: CPT | Performed by: PHYSICAL THERAPIST

## 2025-09-04 ENCOUNTER — HOSPITAL ENCOUNTER (OUTPATIENT)
Dept: PHYSICAL THERAPY | Age: 70
Setting detail: THERAPIES SERIES
Discharge: HOME | End: 2025-09-04
Payer: COMMERCIAL

## 2025-09-04 ENCOUNTER — OFFICE VISIT (OUTPATIENT)
Dept: FAMILY MEDICINE CLINIC | Age: 70
End: 2025-09-04
Payer: COMMERCIAL

## 2025-09-04 VITALS
HEART RATE: 75 BPM | WEIGHT: 170 LBS | RESPIRATION RATE: 16 BRPM | BODY MASS INDEX: 29.18 KG/M2 | DIASTOLIC BLOOD PRESSURE: 72 MMHG | OXYGEN SATURATION: 97 % | TEMPERATURE: 97.9 F | SYSTOLIC BLOOD PRESSURE: 137 MMHG

## 2025-09-04 DIAGNOSIS — Z12.83 SKIN CANCER SCREENING: ICD-10-CM

## 2025-09-04 DIAGNOSIS — I25.10 CORONARY ARTERY DISEASE INVOLVING NATIVE CORONARY ARTERY OF NATIVE HEART WITHOUT ANGINA PECTORIS: ICD-10-CM

## 2025-09-04 DIAGNOSIS — E78.00 ELEVATED LDL CHOLESTEROL LEVEL: Primary | ICD-10-CM

## 2025-09-04 DIAGNOSIS — E78.2 MIXED HYPERLIPIDEMIA: ICD-10-CM

## 2025-09-04 PROCEDURE — 97110 THERAPEUTIC EXERCISES: CPT | Performed by: PHYSICAL THERAPIST

## 2025-09-04 PROCEDURE — 3078F DIAST BP <80 MM HG: CPT | Performed by: FAMILY MEDICINE

## 2025-09-04 PROCEDURE — 3075F SYST BP GE 130 - 139MM HG: CPT | Performed by: FAMILY MEDICINE

## 2025-09-04 PROCEDURE — 97112 NEUROMUSCULAR REEDUCATION: CPT | Performed by: PHYSICAL THERAPIST

## 2025-09-04 PROCEDURE — 97140 MANUAL THERAPY 1/> REGIONS: CPT | Performed by: PHYSICAL THERAPIST

## 2025-09-04 PROCEDURE — 1123F ACP DISCUSS/DSCN MKR DOCD: CPT | Performed by: FAMILY MEDICINE

## 2025-09-04 PROCEDURE — 99214 OFFICE O/P EST MOD 30 MIN: CPT | Performed by: FAMILY MEDICINE

## 2025-09-04 ASSESSMENT — PATIENT HEALTH QUESTIONNAIRE - PHQ9
1. LITTLE INTEREST OR PLEASURE IN DOING THINGS: NOT AT ALL
2. FEELING DOWN, DEPRESSED OR HOPELESS: NOT AT ALL
SUM OF ALL RESPONSES TO PHQ QUESTIONS 1-9: 0

## 2025-09-05 ENCOUNTER — HOSPITAL ENCOUNTER (OUTPATIENT)
Dept: PHYSICAL THERAPY | Age: 70
Setting detail: THERAPIES SERIES
Discharge: HOME | End: 2025-09-05
Payer: COMMERCIAL

## 2025-09-05 PROCEDURE — 97110 THERAPEUTIC EXERCISES: CPT | Performed by: PHYSICAL THERAPIST

## 2025-09-05 PROCEDURE — 97140 MANUAL THERAPY 1/> REGIONS: CPT | Performed by: PHYSICAL THERAPIST

## 2025-09-05 PROCEDURE — 97112 NEUROMUSCULAR REEDUCATION: CPT | Performed by: PHYSICAL THERAPIST

## 2025-09-06 ENCOUNTER — TELEPHONE (OUTPATIENT)
Dept: ORTHOPEDIC SURGERY | Age: 70
End: 2025-09-06

## (undated) DEVICE — GLOVE ORANGE PI 7   MSG9070

## (undated) DEVICE — PACK EXTREMITY

## (undated) DEVICE — BANDAGE COMPR W3INXL5YD TAN BRTH SELF ADH WRP W/ HND TEAR

## (undated) DEVICE — NEEDLE HYPO 22GA L1.5IN BLK POLYPR HUB S STL REG BVL STR

## (undated) DEVICE — SWITCH DRAPE TENET 7633

## (undated) DEVICE — NEEDLE HYPO 18GA L1.5IN PNK POLYPR HUB S STL THN WALL FILL

## (undated) DEVICE — COTTON UNDERCAST PADDING,REGULAR FINISH: Brand: WEBRIL

## (undated) DEVICE — 40763 BEACH CHAIR HEAD RESTRAINT: Brand: 40763 BEACH CHAIR HEAD RESTRAINT

## (undated) DEVICE — STERILE POLYISOPRENE POWDER-FREE SURGICAL GLOVES: Brand: PROTEXIS

## (undated) DEVICE — SOLUTION IRRIG 5L LAC R BG

## (undated) DEVICE — TUBE IRRIG L8IN LNG PT W/ CONN FOR PMP SYS REDEUCE

## (undated) DEVICE — BANDAGE ELASTIC 4 IN

## (undated) DEVICE — ELECTRODE PT RET AD L9FT HI MOIST COND ADH HYDRGEL CORDED

## (undated) DEVICE — Device

## (undated) DEVICE — COTTON UNDERCAST PADDING,CRIMPED FINISH: Brand: WEBRIL

## (undated) DEVICE — SPLINT ORTH W4XL30IN LAYERED FBRGLS FOAM PD BRTH BK MOLD

## (undated) DEVICE — PENCIL SMK EVAC ALL IN 1 DSGN ENH VISIBILITY IMPROVED AIR

## (undated) DEVICE — SYRINGE MED 10ML LUERLOCK TIP W/O SFTY DISP

## (undated) DEVICE — 3M™ IOBAN™ 2 ANTIMICROBIAL INCISE DRAPE 6650EZ: Brand: IOBAN™ 2

## (undated) DEVICE — BLADE ES ELASTOMERIC COAT INSUL DURABLE BEND UPTO 90DEG

## (undated) DEVICE — SUTURE MONOCRYL STRATAFIX SPRL SZ 3-0 L12IN ABSRB UD FS-1 L30X30CM SXMP2B410

## (undated) DEVICE — GLOVE SURG SZ 7 L12IN FNGR THK94MIL STD WHT ISOLEX LTX FREE

## (undated) DEVICE — 3M™ STERI-DRAPE™ U-DRAPE, LONG 1019: Brand: STERI-DRAPE™

## (undated) DEVICE — HOOK/ TRIANGLE BLADE SET: Brand: ENDOTRAC

## (undated) DEVICE — SOLUTION IV 500ML 0.9% SOD BOTTLE CHL LTWT DURABLE SHATTERPROOF

## (undated) DEVICE — SUTURE ETHLN SZ 3-0 L18IN NONABSORBABLE BLK PS-2 L19MM 3/8 1669H

## (undated) DEVICE — DRAPE,U/ SHT,SPLIT,PLAS,STERIL: Brand: MEDLINE

## (undated) DEVICE — GLOVE SURG SZ 7 L12IN FNGR THK79MIL GRN LTX FREE

## (undated) DEVICE — NEEDLE HYPO 18GA L1.5IN PNK POLYPR HUB S STL REG BVL STR

## (undated) DEVICE — GLOVE SURG SZ 8 L12IN FNGR THK79MIL GRN LTX FREE

## (undated) DEVICE — GLOVE SURG SZ 65 L12IN FNGR THK94MIL STD WHT ISOLEX LTX

## (undated) DEVICE — PADDING UNDERCAST W4INXL4YD 100% COT CRIMPED FINISH WBRL II

## (undated) DEVICE — DYONICS 4.0 MM ELITE                                    ACROMIOBLASTER STRAIGHT DISPOSABLE                                    BURRS, SAGE GREEN, 10000 MAXIMUM                                    RPM, PACKAGED 6 PER BOX, STERILE

## (undated) DEVICE — SYRINGE MED 50ML LUERLOCK TIP

## (undated) DEVICE — SUTURE NONABSORBABLE MONOFILAMENT 3-0 PS-1 18 IN BLK ETHILON 1663H

## (undated) DEVICE — GLOVE SURG SZ 8 L12IN FNGR THK94MIL STD WHT LTX FREE

## (undated) DEVICE — DRAPE BEACH CHAIR SHOULDER W/ POUCH 172X100 IN

## (undated) DEVICE — TUBING PMP L8FT LNG W/ CONN FOR AR-6400 REDEUCE

## (undated) DEVICE — WEREWOLF FLOW 90 COBLATION WAND: Brand: COBLATION

## (undated) DEVICE — Z INACTIVE USE 2735373 APPLICATOR FBR LAIN COT WOOD TIP ECONOMICAL

## (undated) DEVICE — BLADE,STAINLESS-STEEL,15,STRL,DISPOSABLE: Brand: MEDLINE

## (undated) DEVICE — DRESSING,GAUZE,XEROFORM,CURAD,1"X8",ST: Brand: CURAD

## (undated) DEVICE — GAUZE,SPONGE,4"X4",16PLY,STRL,LF,10/TRAY: Brand: MEDLINE

## (undated) DEVICE — SHOULDER STABILIZATION KIT,                                    DISPOSABLE 12 PER BOX

## (undated) DEVICE — BANDAGE ELASTIC 6 IN

## (undated) DEVICE — SUTURE MONOCRYL SZ 3-0 L27IN ABSRB UD PS-2 3/8 CIR REV CUT NDL MCP427H

## (undated) DEVICE — 4.5 MM FULL RADIUS STRAIGHT                                    BLADES, POWER/EP-1, YELLOW, PACKAGED                                    6 PER BOX, STERILE: Brand: DYONICS

## (undated) DEVICE — PAD,ABDOMINAL,8"X10",ST,LF: Brand: MEDLINE

## (undated) DEVICE — SOLUTION SODIUM CHL 0.9% 500 ML IRRI BTL

## (undated) DEVICE — TOWEL,OR,DSP,ST,BLUE,STD,8/PK,10PK/CS: Brand: MEDLINE

## (undated) DEVICE — APPLICATOR MEDICATED 10.5 CC SOLUTION HI LT ORNG CHLORAPREP

## (undated) DEVICE — GLOVE SURG SZ 65 THK91MIL LTX FREE SYN POLYISOPRENE

## (undated) DEVICE — GLOVE SURG SZ 65 L12IN FNGR THK79MIL GRN LTX FREE